# Patient Record
Sex: MALE | Race: WHITE | NOT HISPANIC OR LATINO | Employment: FULL TIME | URBAN - METROPOLITAN AREA
[De-identification: names, ages, dates, MRNs, and addresses within clinical notes are randomized per-mention and may not be internally consistent; named-entity substitution may affect disease eponyms.]

---

## 2017-01-09 ENCOUNTER — TELEPHONE (OUTPATIENT)
Dept: UROLOGY | Facility: CLINIC | Age: 52
End: 2017-01-09

## 2017-01-09 DIAGNOSIS — E29.1 MALE HYPOGONADISM: Primary | ICD-10-CM

## 2017-01-09 NOTE — TELEPHONE ENCOUNTER
----- Message from Elaine Alexis sent at 1/9/2017  2:37 PM CST -----  Patient would like to speak with nurse concerning ordering lab work/needs to have done before appointment on January 20th/please call patient back at [phone] to advise.

## 2017-01-12 ENCOUNTER — LAB VISIT (OUTPATIENT)
Dept: LAB | Facility: HOSPITAL | Age: 52
End: 2017-01-12
Attending: UROLOGY
Payer: COMMERCIAL

## 2017-01-12 DIAGNOSIS — E29.1 MALE HYPOGONADISM: ICD-10-CM

## 2017-01-12 LAB
ERYTHROCYTE [DISTWIDTH] IN BLOOD BY AUTOMATED COUNT: 13 %
HCT VFR BLD AUTO: 47 %
HGB BLD-MCNC: 15.8 G/DL
MCH RBC QN AUTO: 27.6 PG
MCHC RBC AUTO-ENTMCNC: 33.6 %
MCV RBC AUTO: 82 FL
PLATELET # BLD AUTO: 220 K/UL
PMV BLD AUTO: 10 FL
RBC # BLD AUTO: 5.72 M/UL
TESTOST SERPL-MCNC: 571 NG/DL
WBC # BLD AUTO: 8.11 K/UL

## 2017-01-12 PROCEDURE — 84403 ASSAY OF TOTAL TESTOSTERONE: CPT

## 2017-01-12 PROCEDURE — 36415 COLL VENOUS BLD VENIPUNCTURE: CPT | Mod: PO

## 2017-01-12 PROCEDURE — 85027 COMPLETE CBC AUTOMATED: CPT

## 2017-01-20 ENCOUNTER — OFFICE VISIT (OUTPATIENT)
Dept: UROLOGY | Facility: CLINIC | Age: 52
End: 2017-01-20
Payer: COMMERCIAL

## 2017-01-20 VITALS
SYSTOLIC BLOOD PRESSURE: 161 MMHG | HEIGHT: 70 IN | DIASTOLIC BLOOD PRESSURE: 87 MMHG | BODY MASS INDEX: 32.51 KG/M2 | HEART RATE: 104 BPM | WEIGHT: 227.06 LBS

## 2017-01-20 DIAGNOSIS — R79.89 LOW TESTOSTERONE: Primary | ICD-10-CM

## 2017-01-20 LAB
BILIRUB SERPL-MCNC: NORMAL MG/DL
BLOOD URINE, POC: NORMAL
COLOR, POC UA: YELLOW
GLUCOSE UR QL STRIP: NORMAL
KETONES UR QL STRIP: NORMAL
LEUKOCYTE ESTERASE URINE, POC: NORMAL
NITRITE, POC UA: NORMAL
PH, POC UA: 5
PROTEIN, POC: NORMAL
SPECIFIC GRAVITY, POC UA: 1.01
UROBILINOGEN, POC UA: NORMAL

## 2017-01-20 PROCEDURE — 1159F MED LIST DOCD IN RCRD: CPT | Mod: S$GLB,,, | Performed by: UROLOGY

## 2017-01-20 PROCEDURE — 3079F DIAST BP 80-89 MM HG: CPT | Mod: S$GLB,,, | Performed by: UROLOGY

## 2017-01-20 PROCEDURE — 3077F SYST BP >= 140 MM HG: CPT | Mod: S$GLB,,, | Performed by: UROLOGY

## 2017-01-20 PROCEDURE — 99999 PR PBB SHADOW E&M-EST. PATIENT-LVL III: CPT | Mod: PBBFAC,,, | Performed by: UROLOGY

## 2017-01-20 PROCEDURE — 99213 OFFICE O/P EST LOW 20 MIN: CPT | Mod: 25,S$GLB,, | Performed by: UROLOGY

## 2017-01-20 PROCEDURE — 81002 URINALYSIS NONAUTO W/O SCOPE: CPT | Mod: S$GLB,,, | Performed by: UROLOGY

## 2017-01-20 NOTE — MR AVS SNAPSHOT
[facility]  [address] [facility] [address]  [facility] [address]  Phone: [phone]                  Ron Weiss   2017 8:00 AM   Office Visit    Description:  Male : 1965   Provider:  ARMAAN Cunha MD   Department:  [facility]           Reason for Visit     Follow-up     Low Testosterone                To Do List           Goals (5 Years of Data)     None      [facility]     [facility] On Call Nurse Care Line -  Assistance  Registered nurses in the [facility] provide clinical advisement, health education, appointment booking, and other advisory services.  Call for this free service at [phone].             Medications           Message regarding Medications     Verify the changes and/or additions to your medication regime listed below are the same as discussed with your clinician today.  If any of these changes or additions are incorrect, please notify your healthcare provider.             Verify that the below list of medications is an accurate representation of the medications you are currently taking.  If none reported, the list may be blank. If incorrect, please contact your healthcare provider. Carry this list with you in case of emergency.           Current Medications     amlodipine (NORVASC) 10 MG tablet Take 10 mg by mouth once daily.    aspirin 81 MG Chew Take 81 mg by mouth once daily.    fish oil-omega-3 fatty acids 300-1,000 mg capsule Take 2 g by mouth once daily.    multivitamin (THERAGRAN) tablet Take 1 tablet by mouth once daily.    pantoprazole (PROTONIX) 40 MG tablet Take 1 tablet by mouth once daily.    ramipril (ALTACE) 10 MG capsule Take 10 mg by mouth once daily.    testosterone cypionate (DEPOTESTOTERONE CYPIONATE) 200 mg/mL injection Inject 0.5 mLs (100 mg total) into the muscle every 14 (fourteen) days.           Clinical Reference Information           Vital Signs - Last Recorded  Most recent update: 2017  8:08 AM by Iva  "Vj, LPN    BP Pulse Ht Wt BMI    (!) 161/87 104 5' 10" (1.778 m) 103 kg (227 lb 1.2 oz) 32.58 kg/m2      Blood Pressure          Most Recent Value    BP  (!)  161/87      Allergies as of 1/20/2017     Contrast Media      Immunizations Administered on Date of Encounter - 1/20/2017     None      "

## 2017-01-20 NOTE — PROGRESS NOTES
Subjective:       Patient ID: Ron Weiss is a 51 y.o. male.    Chief Complaint: Follow-up and Low Testosterone    HPI     51-year-old with ED and low testosterone. He has been on IM testosterone replacement in the past. He recently started a new job in [employer] and has been unable to come in for injections. He complains of decreased energy and decreased libido both of which improve when on testosterone injections. We resumed testosterone replacement 3 months ago.  Dose 100mg every 2 weeks.  He is feeling better.  He has increased energy and improved libido.  He has no voiding complaints.  His last PSA is 0.8.  Peak testosterone is increased to 521  Urine dipstick shows negative for all components.    Review of Systems   Constitutional: Negative for fever.   Genitourinary: Negative for dysuria and hematuria.       Objective:      Physical Exam   Constitutional: He is oriented to person, place, and time. He appears well-developed and well-nourished.   Pulmonary/Chest: Effort normal.   Neurological: He is alert and oriented to person, place, and time.   Skin: No rash noted.   Psychiatric: He has a normal mood and affect.   Vitals reviewed.      Assessment:       1. Low testosterone        Plan:       Low testosterone  -     POCT urine dipstick without microscope  -     Prostate Specific Antigen, Diagnostic; Future; Expected date: 7/23/17  -     Testosterone; Future; Expected date: 7/23/17  -     Hematocrit; Future; Expected date: 7/23/17  -     Hemoglobin; Future; Expected date: 7/23/17      Increase dose to 150 mg every 2 weeks.  RTC 6 months

## 2017-07-07 ENCOUNTER — LAB VISIT (OUTPATIENT)
Dept: LAB | Facility: HOSPITAL | Age: 52
End: 2017-07-07
Attending: UROLOGY
Payer: COMMERCIAL

## 2017-07-07 DIAGNOSIS — R79.89 LOW TESTOSTERONE: ICD-10-CM

## 2017-07-07 LAB
COMPLEXED PSA SERPL-MCNC: 0.95 NG/ML
HCT VFR BLD AUTO: 50.7 %
HGB BLD-MCNC: 16.8 G/DL
TESTOST SERPL-MCNC: 1238 NG/DL

## 2017-07-07 PROCEDURE — 84153 ASSAY OF PSA TOTAL: CPT

## 2017-07-07 PROCEDURE — 84403 ASSAY OF TOTAL TESTOSTERONE: CPT

## 2017-07-07 PROCEDURE — 36415 COLL VENOUS BLD VENIPUNCTURE: CPT | Mod: PO

## 2017-07-07 PROCEDURE — 85018 HEMOGLOBIN: CPT

## 2017-07-07 PROCEDURE — 85014 HEMATOCRIT: CPT

## 2017-07-21 ENCOUNTER — OFFICE VISIT (OUTPATIENT)
Dept: UROLOGY | Facility: CLINIC | Age: 52
End: 2017-07-21
Payer: COMMERCIAL

## 2017-07-21 VITALS
WEIGHT: 222.69 LBS | SYSTOLIC BLOOD PRESSURE: 140 MMHG | HEIGHT: 70 IN | BODY MASS INDEX: 31.88 KG/M2 | HEART RATE: 78 BPM | DIASTOLIC BLOOD PRESSURE: 84 MMHG

## 2017-07-21 DIAGNOSIS — N52.9 IMPOTENCE: ICD-10-CM

## 2017-07-21 DIAGNOSIS — R79.89 LOW TESTOSTERONE: Primary | ICD-10-CM

## 2017-07-21 DIAGNOSIS — N40.0 BPH WITHOUT URINARY OBSTRUCTION: ICD-10-CM

## 2017-07-21 LAB
BILIRUB SERPL-MCNC: NORMAL MG/DL
BLOOD URINE, POC: NORMAL
COLOR, POC UA: YELLOW
GLUCOSE UR QL STRIP: NORMAL
KETONES UR QL STRIP: NORMAL
LEUKOCYTE ESTERASE URINE, POC: NORMAL
NITRITE, POC UA: NORMAL
PH, POC UA: 5
PROTEIN, POC: NORMAL
SPECIFIC GRAVITY, POC UA: 1.02
UROBILINOGEN, POC UA: NORMAL

## 2017-07-21 PROCEDURE — 99214 OFFICE O/P EST MOD 30 MIN: CPT | Mod: 25,S$GLB,, | Performed by: UROLOGY

## 2017-07-21 PROCEDURE — 81002 URINALYSIS NONAUTO W/O SCOPE: CPT | Mod: S$GLB,,, | Performed by: UROLOGY

## 2017-07-21 PROCEDURE — 99999 PR PBB SHADOW E&M-EST. PATIENT-LVL III: CPT | Mod: PBBFAC,,, | Performed by: UROLOGY

## 2017-07-21 NOTE — PROGRESS NOTES
Subjective:       Patient ID: Ron Weiss is a 52 y.o. male.    Chief Complaint: Follow-up    HPI     52-year-old with ED and low testosterone.  He is on IM testosterone replacement.  Current dose is 150 mg every 2 week.  He is doing well.  He has increased energy and improved libido.  He has no voiding complaints.  He denies hematuria and dysuria.  Labs are reviewed.  Peak testosterone is increased to 1238.  His last PSA is 0.95.  H&H are normal.    Urine dipstick shows negative for all components.    Review of Systems   Constitutional: Negative for fever.   Genitourinary: Negative for dysuria and hematuria.       Objective:      Physical Exam   Constitutional: He is oriented to person, place, and time. He appears well-developed and well-nourished.   HENT:   Head: Normocephalic and atraumatic.   Eyes: Conjunctivae are normal.   Cardiovascular: Normal rate.    Pulmonary/Chest: Effort normal.   Genitourinary: Rectal exam shows no mass and anal tone normal. Prostate is enlarged (40g s/s/a). Prostate is not tender.   Musculoskeletal: Normal range of motion.   Neurological: He is alert and oriented to person, place, and time.   Skin: Skin is warm and dry. No rash noted.   Psychiatric: He has a normal mood and affect.   Vitals reviewed.      Assessment:       1. Low testosterone    2. BPH without urinary obstruction    3. Impotence        Plan:       Low testosterone  -     POCT URINE DIPSTICK WITHOUT MICROSCOPE  -     Testosterone; Future; Expected date: 01/21/2018  -     Hemoglobin; Future; Expected date: 01/21/2018  -     Hematocrit; Future; Expected date: 01/21/2018    BPH without urinary obstruction    Impotence      Continue testosterone at current dose.  RTC 6 months with repeat labs

## 2017-08-21 DIAGNOSIS — Z00.00 ROUTINE GENERAL MEDICAL EXAMINATION AT A HEALTH CARE FACILITY: Primary | ICD-10-CM

## 2017-09-08 ENCOUNTER — HOSPITAL ENCOUNTER (OUTPATIENT)
Dept: RADIOLOGY | Facility: HOSPITAL | Age: 52
Discharge: HOME | End: 2017-09-08
Attending: INTERNAL MEDICINE

## 2017-09-08 ENCOUNTER — HOSPITAL ENCOUNTER (OUTPATIENT)
Dept: PULMONOLOGY | Facility: CLINIC | Age: 52
Discharge: HOME | End: 2017-09-08

## 2017-09-08 ENCOUNTER — HOSPITAL ENCOUNTER (OUTPATIENT)
Dept: CARDIOLOGY | Facility: CLINIC | Age: 52
Discharge: HOME | End: 2017-09-08

## 2017-09-08 ENCOUNTER — CLINICAL SUPPORT (OUTPATIENT)
Dept: INTERNAL MEDICINE | Facility: CLINIC | Age: 52
End: 2017-09-08

## 2017-09-08 ENCOUNTER — CLINICAL SUPPORT (OUTPATIENT)
Dept: AUDIOLOGY | Facility: CLINIC | Age: 52
End: 2017-09-08

## 2017-09-08 ENCOUNTER — OFFICE VISIT (OUTPATIENT)
Dept: INFECTIOUS DISEASES | Facility: CLINIC | Age: 52
End: 2017-09-08

## 2017-09-08 VITALS
DIASTOLIC BLOOD PRESSURE: 87 MMHG | TEMPERATURE: 99 F | BODY MASS INDEX: 31.85 KG/M2 | HEIGHT: 70 IN | HEART RATE: 105 BPM | SYSTOLIC BLOOD PRESSURE: 159 MMHG | WEIGHT: 222.44 LBS

## 2017-09-08 DIAGNOSIS — Z00.00 ROUTINE GENERAL MEDICAL EXAMINATION AT A HEALTH CARE FACILITY: ICD-10-CM

## 2017-09-08 DIAGNOSIS — Z80.42 FAMILY HISTORY OF PROSTATE CANCER IN FATHER: ICD-10-CM

## 2017-09-08 DIAGNOSIS — K21.00 GASTROESOPHAGEAL REFLUX DISEASE WITH ESOPHAGITIS: ICD-10-CM

## 2017-09-08 DIAGNOSIS — Z00.00 PREVENTATIVE HEALTH CARE: Primary | ICD-10-CM

## 2017-09-08 DIAGNOSIS — Z00.00 ROUTINE GENERAL MEDICAL EXAMINATION AT A HEALTH CARE FACILITY: Primary | ICD-10-CM

## 2017-09-08 DIAGNOSIS — E29.1 MALE HYPOGONADISM: ICD-10-CM

## 2017-09-08 DIAGNOSIS — G47.33 OBSTRUCTIVE SLEEP APNEA: Chronic | ICD-10-CM

## 2017-09-08 DIAGNOSIS — I10 ESSENTIAL HYPERTENSION: ICD-10-CM

## 2017-09-08 DIAGNOSIS — J30.0 CHRONIC VASOMOTOR RHINITIS: ICD-10-CM

## 2017-09-08 DIAGNOSIS — Z01.10 EXAMINATION OF EARS AND HEARING: Primary | ICD-10-CM

## 2017-09-08 LAB
ALBUMIN SERPL BCP-MCNC: 4 G/DL
ALP SERPL-CCNC: 67 U/L
ALT SERPL W/O P-5'-P-CCNC: 38 U/L
ANION GAP SERPL CALC-SCNC: 13 MMOL/L
AST SERPL-CCNC: 27 U/L
BACTERIA #/AREA URNS AUTO: NORMAL /HPF
BILIRUB SERPL-MCNC: 0.9 MG/DL
BILIRUB UR QL STRIP: NEGATIVE
BUN SERPL-MCNC: 15 MG/DL
CALCIUM SERPL-MCNC: 9.3 MG/DL
CHLORIDE SERPL-SCNC: 103 MMOL/L
CHOLEST SERPL-MCNC: 181 MG/DL
CHOLEST/HDLC SERPL: 4.1 {RATIO}
CLARITY UR REFRACT.AUTO: CLEAR
CO2 SERPL-SCNC: 22 MMOL/L
COLOR UR AUTO: ABNORMAL
CREAT SERPL-MCNC: 1.2 MG/DL
ERYTHROCYTE [DISTWIDTH] IN BLOOD BY AUTOMATED COUNT: 13.5 %
EST. GFR  (AFRICAN AMERICAN): >60 ML/MIN/1.73 M^2
EST. GFR  (NON AFRICAN AMERICAN): >60 ML/MIN/1.73 M^2
GGT SERPL-CCNC: 42 U/L
GLUCOSE SERPL-MCNC: 101 MG/DL
GLUCOSE UR QL STRIP: NEGATIVE
HCT VFR BLD AUTO: 49.4 %
HDLC SERPL-MCNC: 44 MG/DL
HDLC SERPL: 24.3 %
HGB BLD-MCNC: 16.9 G/DL
HGB UR QL STRIP: ABNORMAL
IGE SERPL-ACNC: 91 IU/ML
IRON SERPL-MCNC: 107 UG/DL
KETONES UR QL STRIP: ABNORMAL
LDH SERPL L TO P-CCNC: 200 U/L
LDLC SERPL CALC-MCNC: 120.6 MG/DL
LEUKOCYTE ESTERASE UR QL STRIP: NEGATIVE
MCH RBC QN AUTO: 27.8 PG
MCHC RBC AUTO-ENTMCNC: 34.2 G/DL
MCV RBC AUTO: 81 FL
MICROSCOPIC COMMENT: NORMAL
NITRITE UR QL STRIP: NEGATIVE
NONHDLC SERPL-MCNC: 137 MG/DL
PH UR STRIP: 7 [PH] (ref 5–8)
PHOSPHATE SERPL-MCNC: 2 MG/DL
PLATELET # BLD AUTO: 164 K/UL
PMV BLD AUTO: 9.6 FL
POTASSIUM SERPL-SCNC: 3.8 MMOL/L
PRE FEV1 FVC: 79
PRE FEV1: 3.18
PRE FVC: 4.04
PREDICTED FEV1 FVC: 81
PREDICTED FEV1: 3.77
PREDICTED FVC: 4.63
PROT SERPL-MCNC: 8.1 G/DL
PROT SERPL-MCNC: 8.1 G/DL
PROT UR QL STRIP: NEGATIVE
RBC # BLD AUTO: 6.08 M/UL
RBC #/AREA URNS AUTO: 0 /HPF (ref 0–4)
SODIUM SERPL-SCNC: 138 MMOL/L
SP GR UR STRIP: 1.01 (ref 1–1.03)
TRIGL SERPL-MCNC: 82 MG/DL
URATE SERPL-MCNC: 6.7 MG/DL
URN SPEC COLLECT METH UR: ABNORMAL
UROBILINOGEN UR STRIP-ACNC: NEGATIVE EU/DL
WBC # BLD AUTO: 5.55 K/UL
WBC #/AREA URNS AUTO: 0 /HPF (ref 0–5)

## 2017-09-08 PROCEDURE — 71020 XR CHEST PA AND LATERAL: CPT | Mod: 26,,, | Performed by: RADIOLOGY

## 2017-09-08 PROCEDURE — 99396 PREV VISIT EST AGE 40-64: CPT | Mod: ,,, | Performed by: INTERNAL MEDICINE

## 2017-09-08 PROCEDURE — 99999 PR PBB SHADOW E&M-EST. PATIENT-LVL I: CPT | Mod: PBBFAC,,,

## 2017-09-08 PROCEDURE — 80061 LIPID PANEL: CPT

## 2017-09-08 PROCEDURE — 84100 ASSAY OF PHOSPHORUS: CPT

## 2017-09-08 PROCEDURE — 82785 ASSAY OF IGE: CPT

## 2017-09-08 PROCEDURE — 71020 XR CHEST PA AND LATERAL: CPT | Mod: TC

## 2017-09-08 PROCEDURE — 80053 COMPREHEN METABOLIC PANEL: CPT

## 2017-09-08 PROCEDURE — 92552 PURE TONE AUDIOMETRY AIR: CPT | Mod: ,,, | Performed by: AUDIOLOGIST

## 2017-09-08 PROCEDURE — 83615 LACTATE (LD) (LDH) ENZYME: CPT

## 2017-09-08 PROCEDURE — 94010 BREATHING CAPACITY TEST: CPT | Mod: ,,, | Performed by: INTERNAL MEDICINE

## 2017-09-08 PROCEDURE — 82977 ASSAY OF GGT: CPT

## 2017-09-08 PROCEDURE — 85027 COMPLETE CBC AUTOMATED: CPT

## 2017-09-08 PROCEDURE — 99999 PR PBB SHADOW E&M-EST. PATIENT-LVL III: CPT | Mod: PBBFAC,,, | Performed by: INTERNAL MEDICINE

## 2017-09-08 PROCEDURE — 93000 ELECTROCARDIOGRAM COMPLETE: CPT | Mod: ,,, | Performed by: INTERNAL MEDICINE

## 2017-09-08 PROCEDURE — 81001 URINALYSIS AUTO W/SCOPE: CPT

## 2017-09-08 PROCEDURE — 36415 COLL VENOUS BLD VENIPUNCTURE: CPT

## 2017-09-08 PROCEDURE — 84550 ASSAY OF BLOOD/URIC ACID: CPT

## 2017-09-08 PROCEDURE — 83540 ASSAY OF IRON: CPT

## 2017-09-08 RX ORDER — FLUTICASONE PROPIONATE 50 MCG
1 SPRAY, SUSPENSION (ML) NASAL DAILY
COMMUNITY

## 2017-09-08 NOTE — PROGRESS NOTES
Subjective:      Patient ID: Ron Weiss is a 52 y.o. male.    Chief Complaint:Executive Health      History of Present Illness    HTN (hypertension)  Patient has been on ramipril 10 mg and amlodipine 10 mg daily with good control of BP as measured by his home readings which usually run ~ 130/70. His weight is excessive but stable. We discussed decreasing caloric intake and increasing exercise.     Obstructive sleep apnea  Pt uses CPAP with good results.     Gastroesophageal reflux disease with esophagitis  He has had symptomatic GERD in past and underwent EGD in past at [facility]. Advised then to start protonix which he takes daily with good control of his symptoms.     Male hypogonadism  Was found by urology to have low testosterone level and is on q 2 weekly injections of testosterone.    Family history of prostate cancer in father  Pt is followed by Dr. Cunha and had PSA 0.95 in [facility].       The laboratory studies were reviewed and discussed with the pt.  He has an excellent lipid profile:  Lab Results   Component Value Date    CHOL 181 09/08/2017    CHOL 192 09/07/2016    CHOL 160 08/25/2015     Lab Results   Component Value Date    HDL 44 09/08/2017    HDL 45 09/07/2016    HDL 39 (L) 08/25/2015     Lab Results   Component Value Date    LDLCALC 120.6 09/08/2017    LDLCALC 112.4 09/07/2016    LDLCALC 97.2 08/25/2015     Lab Results   Component Value Date    TRIG 82 09/08/2017    TRIG 173 (H) 09/07/2016    TRIG 119 08/25/2015     Lab Results   Component Value Date    CHOLHDL 24.3 09/08/2017    CHOLHDL 23.4 09/07/2016    CHOLHDL 24.4 08/25/2015   He has had an extensive cardiac workup elsewhere with cardiolyte stress test, carotid US and coronary calcium scores, all of which are reportedly per pt normal.    Review of Systems   Constitution: Negative for chills, decreased appetite, fever, weakness, malaise/fatigue, night sweats, weight gain and weight loss.   HENT: Negative for ear pain, hearing loss, hoarse  voice, sore throat and tinnitus.    Eyes: Negative for blurred vision, redness and visual disturbance.   Cardiovascular: Negative for chest pain, leg swelling and palpitations.   Respiratory: Positive for cough. Negative for hemoptysis, shortness of breath and sputum production.    Hematologic/Lymphatic: Negative for adenopathy. Does not bruise/bleed easily.   Skin: Negative for dry skin, itching, rash and suspicious lesions.   Musculoskeletal: Negative for back pain, joint pain, myalgias and neck pain.   Gastrointestinal: Negative for abdominal pain, constipation, diarrhea, heartburn, nausea and vomiting.   Genitourinary: Negative for dysuria, flank pain, frequency, hematuria, hesitancy and urgency.   Neurological: Negative for dizziness, headaches, numbness and paresthesias.   Psychiatric/Behavioral: Negative for depression and memory loss. The patient does not have insomnia and is not nervous/anxious.      Objective:   Physical Exam   Constitutional: He is oriented to person, place, and time. He appears well-developed and well-nourished.   Overweight     HENT:   Head: Normocephalic and atraumatic.   Right Ear: External ear normal.   Left Ear: External ear normal.   Mouth/Throat: Oropharynx is clear and moist.   Eyes: Conjunctivae and EOM are normal. Pupils are equal, round, and reactive to light.   Neck: Normal range of motion. Neck supple. No thyromegaly present.   Cardiovascular: Normal rate, regular rhythm and normal heart sounds.    No murmur heard.  Pulmonary/Chest: Effort normal and breath sounds normal. He has no wheezes. He has no rales.   Abdominal: Soft. Bowel sounds are normal. He exhibits no mass. There is no tenderness. There is no rebound.   Musculoskeletal: Normal range of motion.   Lymphadenopathy:     He has no cervical adenopathy.   Neurological: He is alert and oriented to person, place, and time.   Skin: Skin is warm and dry.   Psychiatric: He has a normal mood and affect. His behavior is  normal.   Vitals reviewed.    Assessment:       1. Preventative health care    2. Chronic vasomotor rhinitis    3. Family history of prostate cancer in father    4. Obstructive sleep apnea    5. Essential hypertension    6. Male hypogonadism    7. Gastroesophageal reflux disease with esophagitis          Plan:        1. Continue present meds   2. Flu shot today   3. Weight reduction via caloric intake reduction   4. Check on date of last colonoscopy and tetanus vaccination

## 2017-09-08 NOTE — PROGRESS NOTES
Mr. Weiss was seen in the clinic today for an [facility] hearing screening.  He denied a subjective change in his he aring, tinnitus, and dizziness.     Otoscopy revealed significant non-occluding cerumen, AS. Pure tone air conduction screening revealed normal hearing, bilaterally.    The results and recommendations from today's evaluation were discussed with Mr. Weiss.    Recommendations:  1. Annual hearing evaluation  2. Use of hearing protection in noise  3. Make appointment with physician for cerumen removal AS

## 2018-01-03 DIAGNOSIS — R79.89 LOW TESTOSTERONE: ICD-10-CM

## 2018-01-03 DIAGNOSIS — Z12.5 SCREENING FOR PROSTATE CANCER: Primary | ICD-10-CM

## 2018-01-03 RX ORDER — TESTOSTERONE CYPIONATE 200 MG/ML
100 INJECTION, SOLUTION INTRAMUSCULAR
Qty: 10 ML | Refills: 2 | Status: SHIPPED | OUTPATIENT
Start: 2018-01-03 | End: 2018-08-03

## 2018-01-03 NOTE — TELEPHONE ENCOUNTER
----- Message from Jeana Dye sent at 1/3/2018 10:33 AM CST -----  Contact: Jerome  Patient is asking for lab test for PSA to be added to lab appointment for 1/8/18 as has appointment scheduled for 1/26/18. Also requesting refill Rx testosterone cypionate (DEPOTESTOTERONE CYPIONATE) 200 mg/mL injection to be sent today to [facility] at [phone]. Please call to confirm [phone]. Thanks!

## 2018-01-03 NOTE — TELEPHONE ENCOUNTER
----- Message from Destini Christensen sent at 1/3/2018  2:54 PM CST -----  Refill on Rx Testosterone today.   [facility] [place] pharmacy/[phone].  Please call patient at [phone] when called in.

## 2018-01-04 NOTE — TELEPHONE ENCOUNTER
----- Message from Zulema Sanabria sent at 1/4/2018 11:13 AM CST -----  Contact: Patient  Ron, patient [phone] calling for a refill on testosterone cypionate (DEPOTESTOTERONE CYPIONATE) 200 mg/mL injection. Please advise when complete. Thanks.

## 2018-01-08 ENCOUNTER — LAB VISIT (OUTPATIENT)
Dept: LAB | Facility: HOSPITAL | Age: 53
End: 2018-01-08
Attending: UROLOGY
Payer: COMMERCIAL

## 2018-01-08 DIAGNOSIS — R79.89 LOW TESTOSTERONE: ICD-10-CM

## 2018-01-08 DIAGNOSIS — Z12.5 SCREENING FOR PROSTATE CANCER: ICD-10-CM

## 2018-01-08 LAB
COMPLEXED PSA SERPL-MCNC: 0.87 NG/ML
HCT VFR BLD AUTO: 51.6 %
HGB BLD-MCNC: 16.8 G/DL
TESTOST SERPL-MCNC: 1062 NG/DL

## 2018-01-08 PROCEDURE — 84153 ASSAY OF PSA TOTAL: CPT

## 2018-01-08 PROCEDURE — 85018 HEMOGLOBIN: CPT

## 2018-01-08 PROCEDURE — 84403 ASSAY OF TOTAL TESTOSTERONE: CPT

## 2018-01-08 PROCEDURE — 85014 HEMATOCRIT: CPT

## 2018-01-08 PROCEDURE — 36415 COLL VENOUS BLD VENIPUNCTURE: CPT | Mod: PO

## 2018-01-26 ENCOUNTER — OFFICE VISIT (OUTPATIENT)
Dept: UROLOGY | Facility: CLINIC | Age: 53
End: 2018-01-26
Payer: COMMERCIAL

## 2018-01-26 VITALS
DIASTOLIC BLOOD PRESSURE: 93 MMHG | WEIGHT: 229.25 LBS | HEIGHT: 70 IN | SYSTOLIC BLOOD PRESSURE: 143 MMHG | BODY MASS INDEX: 32.82 KG/M2 | HEART RATE: 95 BPM

## 2018-01-26 DIAGNOSIS — R79.89 LOW TESTOSTERONE IN MALE: Primary | ICD-10-CM

## 2018-01-26 PROCEDURE — 99999 PR PBB SHADOW E&M-EST. PATIENT-LVL III: CPT | Mod: PBBFAC,,, | Performed by: UROLOGY

## 2018-01-26 PROCEDURE — 99213 OFFICE O/P EST LOW 20 MIN: CPT | Mod: S$GLB,,, | Performed by: UROLOGY

## 2018-01-26 PROCEDURE — 81002 URINALYSIS NONAUTO W/O SCOPE: CPT | Mod: S$GLB,,, | Performed by: UROLOGY

## 2018-01-26 NOTE — PROGRESS NOTES
Subjective:       Patient ID: Ron Weiss is a 52 y.o. male.    Chief Complaint: Follow-up    HPI     52-year-old with ED and low testosterone.  He is on IM testosterone replacement.  Current dose is 150 mg every 2 week.  He is doing well.  He has increased energy and improved libido.  He has no voiding complaints.  He denies hematuria and dysuria.  Labs are reviewed.  Peak testosterone is increased to 1062.  His last PSA is 0.87.  H&H are normal.    Urine dipstick shows negative for all components.    Review of Systems   Constitutional: Negative for fever.   Genitourinary: Negative for dysuria and hematuria.       Objective:      Physical Exam   Constitutional: He is oriented to person, place, and time. He appears well-developed and well-nourished.   Pulmonary/Chest: Effort normal.   Abdominal: Soft.   Neurological: He is alert and oriented to person, place, and time.   Skin: No rash noted.   Psychiatric: He has a normal mood and affect.   Vitals reviewed.      Assessment:       1. Low testosterone in male        Plan:       Low testosterone in male  -     POCT URINE DIPSTICK WITHOUT MICROSCOPE  -     Testosterone; Future; Expected date: 07/29/2018  -     Hematocrit; Future; Expected date: 07/29/2018  -     Hemoglobin; Future; Expected date: 07/29/2018      Continue testosterone at current dose.  RTC 6 months with repeat labs

## 2018-07-24 ENCOUNTER — TELEPHONE (OUTPATIENT)
Dept: UROLOGY | Facility: CLINIC | Age: 53
End: 2018-07-24

## 2018-07-24 DIAGNOSIS — Z80.42 FAMILY HISTORY OF PROSTATE CANCER: Primary | ICD-10-CM

## 2018-07-24 NOTE — TELEPHONE ENCOUNTER
Pt would like to get a psa total ordered for Thursday's lab work. He has had a psa within the last 6 months but would like to have this ordered and pay for it himself.

## 2018-07-24 NOTE — TELEPHONE ENCOUNTER
Spoke to pt and he is going to call me back if he wants a psa ordered. Pt had one done 6 months ago. Pt is going to call our estimates services to find out the cost. He will let me know

## 2018-07-24 NOTE — TELEPHONE ENCOUNTER
----- Message from Natalya Augustin sent at 7/24/2018  2:57 PM CDT -----  Type: Needs Medical Advice    Who Called:  Patient  Best Call Back Number: [phone]  Additional Information: Patient is hoping to come in on 7/26/18 to get his labs done but he needs office to add a PSA to the orders. Please place orders and call to schedule.

## 2018-07-24 NOTE — TELEPHONE ENCOUNTER
----- Message from Luzma Lagunas sent at 7/24/2018  3:23 PM CDT -----  Contact: Patient  Type: Needs Medical Advice    Who Called:  Patient  Symptoms (please be specific):  na  How long has patient had these symptoms:  ángel  Pharmacy name and phone #:  [facility]  Best Call Back Number:    Additional Information: Calling to speak back with Iva. Please advise. Call to pod. No answer.

## 2018-07-26 ENCOUNTER — LAB VISIT (OUTPATIENT)
Dept: LAB | Facility: HOSPITAL | Age: 53
End: 2018-07-26
Attending: UROLOGY
Payer: COMMERCIAL

## 2018-07-26 DIAGNOSIS — R79.89 LOW TESTOSTERONE IN MALE: ICD-10-CM

## 2018-07-26 DIAGNOSIS — Z80.42 FAMILY HISTORY OF PROSTATE CANCER: ICD-10-CM

## 2018-07-26 LAB
COMPLEXED PSA SERPL-MCNC: 0.79 NG/ML
HCT VFR BLD AUTO: 51.9 %
HGB BLD-MCNC: 17.2 G/DL

## 2018-07-26 PROCEDURE — 36415 COLL VENOUS BLD VENIPUNCTURE: CPT | Mod: PO

## 2018-07-26 PROCEDURE — 84153 ASSAY OF PSA TOTAL: CPT

## 2018-07-26 PROCEDURE — 85018 HEMOGLOBIN: CPT

## 2018-07-26 PROCEDURE — 84403 ASSAY OF TOTAL TESTOSTERONE: CPT

## 2018-07-26 PROCEDURE — 85014 HEMATOCRIT: CPT

## 2018-07-27 LAB — TESTOST SERPL-MCNC: NORMAL NG/DL

## 2018-08-01 LAB — MAYO MISCELLANEOUS RESULT (REF): NORMAL

## 2018-08-03 ENCOUNTER — OFFICE VISIT (OUTPATIENT)
Dept: UROLOGY | Facility: CLINIC | Age: 53
End: 2018-08-03
Payer: COMMERCIAL

## 2018-08-03 VITALS
SYSTOLIC BLOOD PRESSURE: 144 MMHG | BODY MASS INDEX: 32.82 KG/M2 | HEART RATE: 89 BPM | DIASTOLIC BLOOD PRESSURE: 81 MMHG | WEIGHT: 229.25 LBS | HEIGHT: 70 IN

## 2018-08-03 DIAGNOSIS — E29.1 MALE HYPOGONADISM: Primary | ICD-10-CM

## 2018-08-03 DIAGNOSIS — Z12.5 SCREENING FOR PROSTATE CANCER: ICD-10-CM

## 2018-08-03 DIAGNOSIS — Z80.42 FAMILY HISTORY OF PROSTATE CANCER: ICD-10-CM

## 2018-08-03 LAB
BILIRUB SERPL-MCNC: NORMAL MG/DL
BLOOD URINE, POC: NORMAL
COLOR, POC UA: YELLOW
GLUCOSE UR QL STRIP: NORMAL
KETONES UR QL STRIP: NORMAL
LEUKOCYTE ESTERASE URINE, POC: NORMAL
NITRITE, POC UA: NORMAL
PH, POC UA: 7
PROTEIN, POC: NORMAL
SPECIFIC GRAVITY, POC UA: 1.02
UROBILINOGEN, POC UA: NORMAL

## 2018-08-03 PROCEDURE — 3079F DIAST BP 80-89 MM HG: CPT | Mod: CPTII,S$GLB,, | Performed by: UROLOGY

## 2018-08-03 PROCEDURE — 3008F BODY MASS INDEX DOCD: CPT | Mod: CPTII,S$GLB,, | Performed by: UROLOGY

## 2018-08-03 PROCEDURE — 99999 PR PBB SHADOW E&M-EST. PATIENT-LVL III: CPT | Mod: PBBFAC,,, | Performed by: UROLOGY

## 2018-08-03 PROCEDURE — 3077F SYST BP >= 140 MM HG: CPT | Mod: CPTII,S$GLB,, | Performed by: UROLOGY

## 2018-08-03 PROCEDURE — 99214 OFFICE O/P EST MOD 30 MIN: CPT | Mod: 25,S$GLB,, | Performed by: UROLOGY

## 2018-08-03 PROCEDURE — 81002 URINALYSIS NONAUTO W/O SCOPE: CPT | Mod: S$GLB,,, | Performed by: UROLOGY

## 2018-08-03 NOTE — PROGRESS NOTES
Subjective:       Patient ID: Ron Weiss is a 53 y.o. male.    Chief Complaint: Hypogonadism    HPI     53-year-old with ED and low testosterone.  He is on IM testosterone replacement.  Current dose is 150 mg every 2 week.  He is doing well.  He has increased energy and improved libido.  He has no voiding complaints.  He denies hematuria and dysuria.  Labs are reviewed.  Peak testosterone is increased to 904.  His last PSA is 0.79.  H&H are normal.  He has a family history of prostate cancer.  Urine dipstick shows negative for all components.    Review of Systems   Constitutional: Negative for fever.   Genitourinary: Negative for dysuria and hematuria.       Objective:      Physical Exam   Constitutional: He is oriented to person, place, and time. He appears well-developed and well-nourished.   HENT:   Head: Normocephalic and atraumatic.   Eyes: Conjunctivae are normal.   Cardiovascular: Normal rate.    Pulmonary/Chest: Effort normal.   Genitourinary: Rectal exam shows no mass and anal tone normal. Prostate is enlarged (30g, s/s/a). Prostate is not tender.   Musculoskeletal: Normal range of motion.   Neurological: He is alert and oriented to person, place, and time.   Skin: Skin is warm and dry. No rash noted.   Psychiatric: He has a normal mood and affect.   Vitals reviewed.      Assessment:       1. Male hypogonadism    2. Family history of prostate cancer    3. Screening for prostate cancer        Plan:       Male hypogonadism  -     POCT URINE DIPSTICK WITHOUT MICROSCOPE  -     Hemoglobin; Future; Expected date: 02/03/2019  -     Hematocrit; Future; Expected date: 02/03/2019  -     Testosterone; Future; Expected date: 02/03/2019    Family history of prostate cancer    Screening for prostate cancer  -     PSA, Screening; Future; Expected date: 02/03/2019      Continue at current dose.  RTC 6 months

## 2018-09-14 DIAGNOSIS — Z00.00 ROUTINE GENERAL MEDICAL EXAMINATION AT A HEALTH CARE FACILITY: Primary | ICD-10-CM

## 2018-10-19 ENCOUNTER — HOSPITAL ENCOUNTER (OUTPATIENT)
Dept: PULMONOLOGY | Facility: CLINIC | Age: 53
Discharge: HOME | End: 2018-10-19

## 2018-10-19 ENCOUNTER — HOSPITAL ENCOUNTER (OUTPATIENT)
Dept: CARDIOLOGY | Facility: CLINIC | Age: 53
Discharge: HOME | End: 2018-10-19

## 2018-10-19 ENCOUNTER — CLINICAL SUPPORT (OUTPATIENT)
Dept: INTERNAL MEDICINE | Facility: CLINIC | Age: 53
End: 2018-10-19

## 2018-10-19 ENCOUNTER — CLINICAL SUPPORT (OUTPATIENT)
Dept: AUDIOLOGY | Facility: CLINIC | Age: 53
End: 2018-10-19

## 2018-10-19 ENCOUNTER — HOSPITAL ENCOUNTER (OUTPATIENT)
Dept: RADIOLOGY | Facility: HOSPITAL | Age: 53
Discharge: HOME | End: 2018-10-19
Attending: INTERNAL MEDICINE

## 2018-10-19 ENCOUNTER — OFFICE VISIT (OUTPATIENT)
Dept: INTERNAL MEDICINE | Facility: CLINIC | Age: 53
End: 2018-10-19

## 2018-10-19 DIAGNOSIS — Z00.00 ROUTINE GENERAL MEDICAL EXAMINATION AT A HEALTH CARE FACILITY: ICD-10-CM

## 2018-10-19 DIAGNOSIS — Z00.00 ROUTINE GENERAL MEDICAL EXAMINATION AT A HEALTH CARE FACILITY: Primary | ICD-10-CM

## 2018-10-19 LAB
ALBUMIN SERPL BCP-MCNC: 4.2 G/DL
ALP SERPL-CCNC: 64 U/L
ALT SERPL W/O P-5'-P-CCNC: 46 U/L
ANION GAP SERPL CALC-SCNC: 9 MMOL/L
AST SERPL-CCNC: 28 U/L
BACTERIA #/AREA URNS AUTO: NORMAL /HPF
BASOPHILS # BLD AUTO: 0.05 K/UL
BASOPHILS NFR BLD: 0.8 %
BILIRUB SERPL-MCNC: 1 MG/DL
BILIRUB UR QL STRIP: NEGATIVE
BUN SERPL-MCNC: 14 MG/DL
CALCIUM SERPL-MCNC: 9.7 MG/DL
CHLORIDE SERPL-SCNC: 103 MMOL/L
CHOLEST SERPL-MCNC: 175 MG/DL
CHOLEST/HDLC SERPL: 3.9 {RATIO}
CLARITY UR REFRACT.AUTO: CLEAR
CO2 SERPL-SCNC: 27 MMOL/L
COLOR UR AUTO: NORMAL
CREAT SERPL-MCNC: 1.1 MG/DL
DIFFERENTIAL METHOD: ABNORMAL
EOSINOPHIL # BLD AUTO: 0.1 K/UL
EOSINOPHIL NFR BLD: 2 %
ERYTHROCYTE [DISTWIDTH] IN BLOOD BY AUTOMATED COUNT: 13.2 %
EST. GFR  (AFRICAN AMERICAN): >60 ML/MIN/1.73 M^2
EST. GFR  (NON AFRICAN AMERICAN): >60 ML/MIN/1.73 M^2
GGT SERPL-CCNC: 55 U/L
GLUCOSE SERPL-MCNC: 109 MG/DL
GLUCOSE UR QL STRIP: NEGATIVE
HCT VFR BLD AUTO: 54.1 %
HDLC SERPL-MCNC: 45 MG/DL
HDLC SERPL: 25.7 %
HGB BLD-MCNC: 17 G/DL
HGB UR QL STRIP: NEGATIVE
IGE SERPL-ACNC: 169 IU/ML
IMM GRANULOCYTES # BLD AUTO: 0.06 K/UL
IMM GRANULOCYTES NFR BLD AUTO: 0.9 %
IRON SERPL-MCNC: 117 UG/DL
KETONES UR QL STRIP: NEGATIVE
LDH SERPL L TO P-CCNC: 244 U/L
LDLC SERPL CALC-MCNC: 107.2 MG/DL
LEUKOCYTE ESTERASE UR QL STRIP: NEGATIVE
LYMPHOCYTES # BLD AUTO: 1.7 K/UL
LYMPHOCYTES NFR BLD: 26.6 %
MCH RBC QN AUTO: 26.9 PG
MCHC RBC AUTO-ENTMCNC: 31.4 G/DL
MCV RBC AUTO: 86 FL
MICROSCOPIC COMMENT: NORMAL
MONOCYTES # BLD AUTO: 0.5 K/UL
MONOCYTES NFR BLD: 7.8 %
NEUTROPHILS # BLD AUTO: 4.1 K/UL
NEUTROPHILS NFR BLD: 61.9 %
NITRITE UR QL STRIP: NEGATIVE
NONHDLC SERPL-MCNC: 130 MG/DL
NRBC BLD-RTO: 0 /100 WBC
PH UR STRIP: 7 [PH] (ref 5–8)
PHOSPHATE SERPL-MCNC: 1.9 MG/DL
PLATELET # BLD AUTO: 197 K/UL
PMV BLD AUTO: 9.8 FL
POTASSIUM SERPL-SCNC: 4 MMOL/L
PRE FEV1 FVC: 79
PRE FEV1: 2.98
PRE FVC: 3.76
PREDICTED FEV1 FVC: 81
PREDICTED FEV1: 3.74
PREDICTED FVC: 4.6
PROT SERPL-MCNC: 8.1 G/DL
PROT SERPL-MCNC: 8.1 G/DL
PROT UR QL STRIP: NEGATIVE
RBC # BLD AUTO: 6.31 M/UL
RBC #/AREA URNS AUTO: 1 /HPF (ref 0–4)
SODIUM SERPL-SCNC: 139 MMOL/L
SP GR UR STRIP: 1 (ref 1–1.03)
SQUAMOUS #/AREA URNS AUTO: 2 /HPF
TRIGL SERPL-MCNC: 114 MG/DL
URATE SERPL-MCNC: 6.2 MG/DL
URN SPEC COLLECT METH UR: NORMAL
UROBILINOGEN UR STRIP-ACNC: NEGATIVE EU/DL
WBC # BLD AUTO: 6.55 K/UL
WBC #/AREA URNS AUTO: 1 /HPF (ref 0–5)

## 2018-10-19 PROCEDURE — 71046 X-RAY EXAM CHEST 2 VIEWS: CPT | Mod: 26,,, | Performed by: RADIOLOGY

## 2018-10-19 PROCEDURE — 84100 ASSAY OF PHOSPHORUS: CPT

## 2018-10-19 PROCEDURE — 99999 PR PBB SHADOW E&M-EST. PATIENT-LVL III: CPT | Mod: PBBFAC,,, | Performed by: INTERNAL MEDICINE

## 2018-10-19 PROCEDURE — 92552 PURE TONE AUDIOMETRY AIR: CPT | Mod: S$GLB,,, | Performed by: AUDIOLOGIST-HEARING AID FITTER

## 2018-10-19 PROCEDURE — 82785 ASSAY OF IGE: CPT

## 2018-10-19 PROCEDURE — 80061 LIPID PANEL: CPT

## 2018-10-19 PROCEDURE — 82977 ASSAY OF GGT: CPT

## 2018-10-19 PROCEDURE — 36415 COLL VENOUS BLD VENIPUNCTURE: CPT

## 2018-10-19 PROCEDURE — 83540 ASSAY OF IRON: CPT

## 2018-10-19 PROCEDURE — 85025 COMPLETE CBC W/AUTO DIFF WBC: CPT

## 2018-10-19 PROCEDURE — 99386 PREV VISIT NEW AGE 40-64: CPT | Mod: S$GLB,,, | Performed by: INTERNAL MEDICINE

## 2018-10-19 PROCEDURE — 81001 URINALYSIS AUTO W/SCOPE: CPT

## 2018-10-19 PROCEDURE — 94010 BREATHING CAPACITY TEST: CPT | Mod: S$GLB,,, | Performed by: INTERNAL MEDICINE

## 2018-10-19 PROCEDURE — 83615 LACTATE (LD) (LDH) ENZYME: CPT

## 2018-10-19 PROCEDURE — 93000 ELECTROCARDIOGRAM COMPLETE: CPT | Mod: S$GLB,,, | Performed by: INTERNAL MEDICINE

## 2018-10-19 PROCEDURE — 71046 X-RAY EXAM CHEST 2 VIEWS: CPT | Mod: TC,FY

## 2018-10-19 PROCEDURE — 80053 COMPREHEN METABOLIC PANEL: CPT

## 2018-10-19 PROCEDURE — 84550 ASSAY OF BLOOD/URIC ACID: CPT

## 2018-10-19 RX ORDER — MONTELUKAST SODIUM 10 MG/1
10 TABLET ORAL NIGHTLY
COMMUNITY
End: 2020-02-10

## 2018-10-19 RX ORDER — TESTOSTERONE CYPIONATE 1000 MG/10ML
200 INJECTION, SOLUTION INTRAMUSCULAR
COMMUNITY
End: 2019-01-02 | Stop reason: SDUPTHER

## 2018-10-19 NOTE — PROGRESS NOTES
Ron Weiss was seen in the clinic today for an [facility] hearing screening. Mr. Weiss denied hearing loss, tinnitus, and dizziness. He  Reported occasional noise exposure.     Pure tone air conduction screening revealed hearing within normal limits in the right ear and normal to borderline normal hearing in the left ear.    The results and recommendations from today's evaluation were discussed with Mr. Weiss.    Recommendations:  1. Annual hearing evaluation  2. Use of hearing protection in noise

## 2018-10-26 VITALS
DIASTOLIC BLOOD PRESSURE: 86 MMHG | SYSTOLIC BLOOD PRESSURE: 136 MMHG | WEIGHT: 229.38 LBS | BODY MASS INDEX: 32.92 KG/M2 | HEART RATE: 68 BPM

## 2018-10-26 NOTE — PROGRESS NOTES
Subjective:       Patient ID: Ron Weiss is a 53 y.o. male.    Chief Complaint: Executive Health    HPI Mr Weiss is a very pleasant gentleman from [facility] here for his Executive Health exam. Overall doing well and had no specific complaints. WE discussed the importance of proper nutrition, exercise, weight management vis a vis future health issues.  I am sending copies of his studies including blood work that was basically unremarkable with normal fasting glucose levels, normal renal/liver function. There was slight increase in RBC's at 6.31 that may be due to his sleep apnea. All other parameters were negative including chest xray and unchanged EKG.  Review of Systems   Constitutional: Negative for activity change, appetite change, fatigue and unexpected weight change.   HENT: Negative.    Respiratory: Negative for cough, shortness of breath and wheezing.    Cardiovascular: Negative for chest pain and leg swelling.   Gastrointestinal: Negative for abdominal distention, abdominal pain and blood in stool.   Genitourinary: Negative for difficulty urinating.   Musculoskeletal: Negative for arthralgias, back pain and joint swelling.   Skin: Negative.    Neurological: Negative for dizziness, weakness, light-headedness and headaches.       Objective:      Physical Exam   Constitutional: He is oriented to person, place, and time. He appears well-developed and well-nourished.   HENT:   Head: Normocephalic and atraumatic.   Right Ear: External ear normal.   Left Ear: External ear normal.   Mouth/Throat: Oropharynx is clear and moist. No oropharyngeal exudate.   Eyes: Conjunctivae and EOM are normal. Pupils are equal, round, and reactive to light. Right eye exhibits no discharge. Left eye exhibits no discharge.   Neck: Normal range of motion. Neck supple. No JVD present. No thyromegaly present.   Cardiovascular: Normal rate, regular rhythm, normal heart sounds and intact distal pulses.   No murmur  heard.  Pulmonary/Chest: Effort normal and breath sounds normal. No respiratory distress. He has no wheezes. He exhibits no tenderness.   Abdominal: Soft. Bowel sounds are normal. He exhibits no distension and no mass. There is no tenderness.   Musculoskeletal: Normal range of motion. He exhibits no edema or tenderness.   Lymphadenopathy:     He has no cervical adenopathy.   Neurological: He is alert and oriented to person, place, and time. No cranial nerve deficit. Coordination normal.   Skin: Skin is warm and dry. No rash noted. No erythema.   Psychiatric: He has a normal mood and affect. His behavior is normal. Judgment and thought content normal.   Nursing note and vitals reviewed.      Assessment:       1. Routine general medical examination at a health care facility        Plan:   1. Return to clinic in 1 year or sooner if needed.

## 2019-01-02 DIAGNOSIS — E29.1 MALE HYPOGONADISM: Primary | ICD-10-CM

## 2019-01-02 RX ORDER — TESTOSTERONE CYPIONATE 1000 MG/10ML
200 INJECTION, SOLUTION INTRAMUSCULAR
Qty: 10 ML | Refills: 0 | Status: SHIPPED | OUTPATIENT
Start: 2019-01-02 | End: 2019-01-07 | Stop reason: SDUPTHER

## 2019-01-02 NOTE — TELEPHONE ENCOUNTER
----- Message from Luzma Lagunas sent at 1/2/2019  9:13 AM CST -----  Contact: Patient  Type:  RX Refill Request    Who Called: Patient  Refill or New Rx:  Refill  RX Name and Strength:  testosterone cypionate (DEPOTESTOTERONE CYPIONATE) 100 mg/mL injection  How is the patient currently taking it? (ex. 1XDay):  Inject 200 mg into the muscle every 14 (fourteen) days. - Intramuscular  Is this a 30 day or 90 day RX:  na  Preferred Pharmacy with phone number:    [address] [address]  Phone: [phone] Fax: [phone]  Local or Mail Order: Local  Ordering Provider:  Dr iFdel Cunha  Best Call Back Number:  [phone]  Additional Information: Calling to request a refill. Please advise.

## 2019-01-07 DIAGNOSIS — E29.1 MALE HYPOGONADISM: ICD-10-CM

## 2019-01-07 RX ORDER — TESTOSTERONE CYPIONATE 1000 MG/10ML
200 INJECTION, SOLUTION INTRAMUSCULAR
Qty: 10 ML | Refills: 2 | Status: SHIPPED | OUTPATIENT
Start: 2019-01-07 | End: 2019-02-11 | Stop reason: DRUGHIGH

## 2019-01-07 NOTE — TELEPHONE ENCOUNTER
----- Message from Merced Dominguez sent at 1/7/2019  2:36 PM CST -----  Contact: Patient  Type:  RX Refill Request    Who Called:  Patient  Refill or New Rx:  refill  RX Name and Strength:  testosterone cypionate (DEPOTESTOTERONE CYPIONATE) 100 mg/mL injection  How is the patient currently taking it? (ex. 1XDay):  na  Is this a 30 day or 90 day RX:  60 months  Preferred Pharmacy with phone number:    [address] [address]  Phone: [phone] Fax: [phone]     Local or Mail Order:  local  Ordering Provider:  Dr. Cunha  [facility] Call Back Number: [phone]    Additional Information:  Patient needs this sent to [facility]. It was sent to [facility] on 1/2/19. Thank you!

## 2019-01-31 ENCOUNTER — LAB VISIT (OUTPATIENT)
Dept: LAB | Facility: HOSPITAL | Age: 54
End: 2019-01-31
Attending: UROLOGY
Payer: COMMERCIAL

## 2019-01-31 DIAGNOSIS — Z12.5 SCREENING FOR PROSTATE CANCER: ICD-10-CM

## 2019-01-31 DIAGNOSIS — E29.1 MALE HYPOGONADISM: ICD-10-CM

## 2019-01-31 LAB
COMPLEXED PSA SERPL-MCNC: 0.89 NG/ML
HCT VFR BLD AUTO: 51 %
HGB BLD-MCNC: 16.5 G/DL
TESTOST SERPL-MCNC: 496 NG/DL

## 2019-01-31 PROCEDURE — 36415 COLL VENOUS BLD VENIPUNCTURE: CPT | Mod: PO

## 2019-01-31 PROCEDURE — 84403 ASSAY OF TOTAL TESTOSTERONE: CPT

## 2019-01-31 PROCEDURE — 85014 HEMATOCRIT: CPT

## 2019-01-31 PROCEDURE — 84153 ASSAY OF PSA TOTAL: CPT

## 2019-01-31 PROCEDURE — 85018 HEMOGLOBIN: CPT

## 2019-02-11 ENCOUNTER — OFFICE VISIT (OUTPATIENT)
Dept: UROLOGY | Facility: CLINIC | Age: 54
End: 2019-02-11
Payer: COMMERCIAL

## 2019-02-11 VITALS
HEART RATE: 95 BPM | HEIGHT: 70 IN | DIASTOLIC BLOOD PRESSURE: 83 MMHG | SYSTOLIC BLOOD PRESSURE: 150 MMHG | BODY MASS INDEX: 31.07 KG/M2 | WEIGHT: 217 LBS

## 2019-02-11 DIAGNOSIS — R79.89 LOW TESTOSTERONE: Primary | ICD-10-CM

## 2019-02-11 DIAGNOSIS — N52.9 IMPOTENCE: ICD-10-CM

## 2019-02-11 LAB
BILIRUB SERPL-MCNC: NORMAL MG/DL
BLOOD URINE, POC: NORMAL
COLOR, POC UA: NORMAL
GLUCOSE UR QL STRIP: NORMAL
KETONES UR QL STRIP: NORMAL
LEUKOCYTE ESTERASE URINE, POC: NORMAL
NITRITE, POC UA: NORMAL
PH, POC UA: 7
PROTEIN, POC: NORMAL
SPECIFIC GRAVITY, POC UA: 1015
UROBILINOGEN, POC UA: NORMAL

## 2019-02-11 PROCEDURE — 3008F PR BODY MASS INDEX (BMI) DOCUMENTED: ICD-10-PCS | Mod: CPTII,S$GLB,, | Performed by: UROLOGY

## 2019-02-11 PROCEDURE — 3077F PR MOST RECENT SYSTOLIC BLOOD PRESSURE >= 140 MM HG: ICD-10-PCS | Mod: CPTII,S$GLB,, | Performed by: UROLOGY

## 2019-02-11 PROCEDURE — 3079F PR MOST RECENT DIASTOLIC BLOOD PRESSURE 80-89 MM HG: ICD-10-PCS | Mod: CPTII,S$GLB,, | Performed by: UROLOGY

## 2019-02-11 PROCEDURE — 99999 PR PBB SHADOW E&M-EST. PATIENT-LVL III: CPT | Mod: PBBFAC,,, | Performed by: UROLOGY

## 2019-02-11 PROCEDURE — 99213 OFFICE O/P EST LOW 20 MIN: CPT | Mod: 25,S$GLB,, | Performed by: UROLOGY

## 2019-02-11 PROCEDURE — 3077F SYST BP >= 140 MM HG: CPT | Mod: CPTII,S$GLB,, | Performed by: UROLOGY

## 2019-02-11 PROCEDURE — 3079F DIAST BP 80-89 MM HG: CPT | Mod: CPTII,S$GLB,, | Performed by: UROLOGY

## 2019-02-11 PROCEDURE — 99999 PR PBB SHADOW E&M-EST. PATIENT-LVL III: ICD-10-PCS | Mod: PBBFAC,,, | Performed by: UROLOGY

## 2019-02-11 PROCEDURE — 99213 PR OFFICE/OUTPT VISIT, EST, LEVL III, 20-29 MIN: ICD-10-PCS | Mod: 25,S$GLB,, | Performed by: UROLOGY

## 2019-02-11 PROCEDURE — 3008F BODY MASS INDEX DOCD: CPT | Mod: CPTII,S$GLB,, | Performed by: UROLOGY

## 2019-02-11 PROCEDURE — 81002 POCT URINE DIPSTICK WITHOUT MICROSCOPE: ICD-10-PCS | Mod: S$GLB,,, | Performed by: UROLOGY

## 2019-02-11 PROCEDURE — 81002 URINALYSIS NONAUTO W/O SCOPE: CPT | Mod: S$GLB,,, | Performed by: UROLOGY

## 2019-02-11 RX ORDER — BISACODYL 5 MG
TABLET, DELAYED RELEASE (ENTERIC COATED) ORAL
COMMUNITY
End: 2020-02-10

## 2019-02-11 RX ORDER — TESTOSTERONE CYPIONATE 200 MG/ML
150 INJECTION, SOLUTION INTRAMUSCULAR
Qty: 10 ML | Refills: 2 | Status: SHIPPED | OUTPATIENT
Start: 2019-02-11 | End: 2019-08-05 | Stop reason: SDUPTHER

## 2019-02-11 RX ORDER — HYDROCHLOROTHIAZIDE 12.5 MG/1
CAPSULE ORAL
COMMUNITY
End: 2020-02-10

## 2019-02-11 RX ORDER — OMEPRAZOLE 40 MG/1
CAPSULE, DELAYED RELEASE ORAL
COMMUNITY
End: 2020-02-10

## 2019-02-11 RX ORDER — RABEPRAZOLE SODIUM 20 MG/1
TABLET, DELAYED RELEASE ORAL
COMMUNITY
End: 2020-02-10

## 2019-02-11 NOTE — PROGRESS NOTES
Subjective:       Patient ID: Ron Weiss is a 53 y.o. male.    Chief Complaint: Follow-up (testosterone injection)    HPI     53-year-old with ED and low testosterone.  He is on IM testosterone replacement.  Current dose is 150 mg every 2 week.  He got a recent Rx refill and there was some confusion and RX filled at only 100mg per ml.  He has been giving the smaller incorrect dose (75mg) every.  He has noticed less energy lately.  Peak testosterone is 496.  PSA is 0.89.  He has no voiding complaints.  He denies hematuria and dysuria.  H&H are normal.  He has a family history of prostate cancer.  Urine dipstick shows negative for all components.    Review of Systems   Constitutional: Negative for fever.   Genitourinary: Negative for dysuria and hematuria.       Objective:      Physical Exam   Constitutional: He is oriented to person, place, and time. He appears well-developed and well-nourished.   Pulmonary/Chest: Effort normal.   Neurological: He is alert and oriented to person, place, and time.   Skin: No rash noted.   Psychiatric: He has a normal mood and affect.   Vitals reviewed.      Assessment:       1. Low testosterone    2. Impotence        Plan:       Low testosterone  -     POCT URINE DIPSTICK WITHOUT MICROSCOPE  -     Hematocrit; Future; Expected date: 08/14/2019  -     Hemoglobin; Future; Expected date: 08/14/2019  -     Testosterone; Future; Expected date: 08/14/2019    Impotence    Other orders  -     testosterone cypionate (DEPOTESTOTERONE CYPIONATE) 200 mg/mL injection; Inject 0.75 mLs (150 mg total) into the muscle every 14 (fourteen) days.  Dispense: 10 mL; Refill: 2      Resume 150 mg every 2 weeks.  RTC 6 months with labs

## 2019-07-31 ENCOUNTER — LAB VISIT (OUTPATIENT)
Dept: LAB | Facility: HOSPITAL | Age: 54
End: 2019-07-31
Attending: UROLOGY
Payer: COMMERCIAL

## 2019-07-31 DIAGNOSIS — R79.89 LOW TESTOSTERONE: ICD-10-CM

## 2019-07-31 LAB
HCT VFR BLD AUTO: 52.8 % (ref 40–54)
HGB BLD-MCNC: 17.2 G/DL (ref 14–18)
TESTOST SERPL-MCNC: 793 NG/DL (ref 304–1227)

## 2019-07-31 PROCEDURE — 85014 HEMATOCRIT: CPT

## 2019-07-31 PROCEDURE — 85018 HEMOGLOBIN: CPT

## 2019-07-31 PROCEDURE — 84403 ASSAY OF TOTAL TESTOSTERONE: CPT

## 2019-07-31 PROCEDURE — 36415 COLL VENOUS BLD VENIPUNCTURE: CPT | Mod: PO

## 2019-08-05 ENCOUNTER — OFFICE VISIT (OUTPATIENT)
Dept: UROLOGY | Facility: CLINIC | Age: 54
End: 2019-08-05
Payer: COMMERCIAL

## 2019-08-05 VITALS
WEIGHT: 221.56 LBS | HEART RATE: 99 BPM | SYSTOLIC BLOOD PRESSURE: 154 MMHG | DIASTOLIC BLOOD PRESSURE: 80 MMHG | BODY MASS INDEX: 31.72 KG/M2 | HEIGHT: 70 IN

## 2019-08-05 DIAGNOSIS — Z12.5 SCREENING FOR PROSTATE CANCER: ICD-10-CM

## 2019-08-05 DIAGNOSIS — Z80.42 FAMILY HISTORY OF PROSTATE CANCER: ICD-10-CM

## 2019-08-05 DIAGNOSIS — N52.9 IMPOTENCE: ICD-10-CM

## 2019-08-05 DIAGNOSIS — R79.89 LOW TESTOSTERONE: Primary | ICD-10-CM

## 2019-08-05 DIAGNOSIS — N40.0 BPH WITHOUT URINARY OBSTRUCTION: ICD-10-CM

## 2019-08-05 LAB
BILIRUB SERPL-MCNC: ABNORMAL MG/DL
BLOOD URINE, POC: ABNORMAL
COLOR, POC UA: ABNORMAL
GLUCOSE UR QL STRIP: ABNORMAL
KETONES UR QL STRIP: ABNORMAL
LEUKOCYTE ESTERASE URINE, POC: ABNORMAL
NITRITE, POC UA: ABNORMAL
PH, POC UA: 6
PROTEIN, POC: ABNORMAL
SPECIFIC GRAVITY, POC UA: 1025
UROBILINOGEN, POC UA: ABNORMAL

## 2019-08-05 PROCEDURE — 81002 URINALYSIS NONAUTO W/O SCOPE: CPT | Mod: S$GLB,,, | Performed by: UROLOGY

## 2019-08-05 PROCEDURE — 99214 OFFICE O/P EST MOD 30 MIN: CPT | Mod: 25,S$GLB,, | Performed by: UROLOGY

## 2019-08-05 PROCEDURE — 3008F BODY MASS INDEX DOCD: CPT | Mod: CPTII,S$GLB,, | Performed by: UROLOGY

## 2019-08-05 PROCEDURE — 3077F PR MOST RECENT SYSTOLIC BLOOD PRESSURE >= 140 MM HG: ICD-10-PCS | Mod: CPTII,S$GLB,, | Performed by: UROLOGY

## 2019-08-05 PROCEDURE — 99214 PR OFFICE/OUTPT VISIT, EST, LEVL IV, 30-39 MIN: ICD-10-PCS | Mod: 25,S$GLB,, | Performed by: UROLOGY

## 2019-08-05 PROCEDURE — 3077F SYST BP >= 140 MM HG: CPT | Mod: CPTII,S$GLB,, | Performed by: UROLOGY

## 2019-08-05 PROCEDURE — 81002 POCT URINE DIPSTICK WITHOUT MICROSCOPE: ICD-10-PCS | Mod: S$GLB,,, | Performed by: UROLOGY

## 2019-08-05 PROCEDURE — 3079F DIAST BP 80-89 MM HG: CPT | Mod: CPTII,S$GLB,, | Performed by: UROLOGY

## 2019-08-05 PROCEDURE — 99999 PR PBB SHADOW E&M-EST. PATIENT-LVL III: ICD-10-PCS | Mod: PBBFAC,,, | Performed by: UROLOGY

## 2019-08-05 PROCEDURE — 99999 PR PBB SHADOW E&M-EST. PATIENT-LVL III: CPT | Mod: PBBFAC,,, | Performed by: UROLOGY

## 2019-08-05 PROCEDURE — 3079F PR MOST RECENT DIASTOLIC BLOOD PRESSURE 80-89 MM HG: ICD-10-PCS | Mod: CPTII,S$GLB,, | Performed by: UROLOGY

## 2019-08-05 PROCEDURE — 3008F PR BODY MASS INDEX (BMI) DOCUMENTED: ICD-10-PCS | Mod: CPTII,S$GLB,, | Performed by: UROLOGY

## 2019-08-05 RX ORDER — TESTOSTERONE CYPIONATE 200 MG/ML
150 INJECTION, SOLUTION INTRAMUSCULAR
Qty: 10 ML | Refills: 2 | Status: SHIPPED | OUTPATIENT
Start: 2019-08-05 | End: 2020-08-10 | Stop reason: SDUPTHER

## 2019-08-05 NOTE — PROGRESS NOTES
Subjective:       Patient ID: Ron Weiss is a 54 y.o. male.    Chief Complaint: Low Testosterone (follow up with labs)    HPI     54-year-old with ED and low testosterone.  He is on IM testosterone replacement.  Current dose is 150 mg every 2 week.  Peak testosterone is 793.  Last PSA is 0.89.  He has no voiding complaints.  He denies hematuria and dysuria.  H&H are normal.  He has a strong family history of prostate cancer.  He was to monitor his PSA every 6 months.  Urine dipstick shows negative for all components.    Review of Systems   Constitutional: Negative for fever.   Genitourinary: Negative for dysuria and hematuria.       Objective:      Physical Exam   Constitutional: He is oriented to person, place, and time. He appears well-developed and well-nourished.   HENT:   Head: Normocephalic and atraumatic.   Eyes: Conjunctivae are normal.   Cardiovascular: Normal rate.   Pulmonary/Chest: Effort normal.   Genitourinary: Rectal exam shows no mass and anal tone normal. Prostate is enlarged (40g, s/s/a). Prostate is not tender.   Musculoskeletal: Normal range of motion. He exhibits no edema.   Neurological: He is alert and oriented to person, place, and time.   Skin: Skin is warm and dry. No rash noted.   Psychiatric: He has a normal mood and affect.   Vitals reviewed.      Assessment:       1. Low testosterone    2. BPH without urinary obstruction    3. Family history of prostate cancer    4. Impotence    5. Screening for prostate cancer        Plan:       Low testosterone  -     POCT URINE DIPSTICK WITHOUT MICROSCOPE  -     Hemoglobin; Future; Expected date: 02/05/2020  -     Hematocrit; Future; Expected date: 02/05/2020  -     Testosterone; Future; Expected date: 02/05/2020    BPH without urinary obstruction    Family history of prostate cancer    Impotence    Screening for prostate cancer  -     PSA, Screening; Future; Expected date: 08/05/2019    Other orders  -     testosterone cypionate  (DEPOTESTOTERONE CYPIONATE) 200 mg/mL injection; Inject 0.75 mLs (150 mg total) into the muscle every 14 (fourteen) days.  Dispense: 10 mL; Refill: 2

## 2019-09-20 DIAGNOSIS — Z00.00 ROUTINE GENERAL MEDICAL EXAMINATION AT A HEALTH CARE FACILITY: Primary | ICD-10-CM

## 2019-10-01 ENCOUNTER — PATIENT MESSAGE (OUTPATIENT)
Dept: INTERNAL MEDICINE | Facility: CLINIC | Age: 54
End: 2019-10-01

## 2019-10-01 ENCOUNTER — OFFICE VISIT (OUTPATIENT)
Dept: INTERNAL MEDICINE | Facility: CLINIC | Age: 54
End: 2019-10-01

## 2019-10-01 ENCOUNTER — CLINICAL SUPPORT (OUTPATIENT)
Dept: INTERNAL MEDICINE | Facility: CLINIC | Age: 54
End: 2019-10-01

## 2019-10-01 ENCOUNTER — HOSPITAL ENCOUNTER (OUTPATIENT)
Dept: CARDIOLOGY | Facility: CLINIC | Age: 54
Discharge: HOME | End: 2019-10-01
Payer: COMMERCIAL

## 2019-10-01 ENCOUNTER — HOSPITAL ENCOUNTER (OUTPATIENT)
Dept: PULMONOLOGY | Facility: CLINIC | Age: 54
Discharge: HOME | End: 2019-10-01

## 2019-10-01 ENCOUNTER — HOSPITAL ENCOUNTER (OUTPATIENT)
Dept: RADIOLOGY | Facility: HOSPITAL | Age: 54
Discharge: HOME | End: 2019-10-01
Attending: INTERNAL MEDICINE

## 2019-10-01 ENCOUNTER — CLINICAL SUPPORT (OUTPATIENT)
Dept: AUDIOLOGY | Facility: CLINIC | Age: 54
End: 2019-10-01

## 2019-10-01 VITALS
HEART RATE: 91 BPM | DIASTOLIC BLOOD PRESSURE: 84 MMHG | BODY MASS INDEX: 31.7 KG/M2 | OXYGEN SATURATION: 97 % | SYSTOLIC BLOOD PRESSURE: 140 MMHG | WEIGHT: 220.88 LBS

## 2019-10-01 DIAGNOSIS — Z00.00 ROUTINE GENERAL MEDICAL EXAMINATION AT A HEALTH CARE FACILITY: ICD-10-CM

## 2019-10-01 DIAGNOSIS — E29.1 MALE HYPOGONADISM: ICD-10-CM

## 2019-10-01 DIAGNOSIS — R06.02 SOB (SHORTNESS OF BREATH): ICD-10-CM

## 2019-10-01 DIAGNOSIS — K21.00 GASTROESOPHAGEAL REFLUX DISEASE WITH ESOPHAGITIS: ICD-10-CM

## 2019-10-01 DIAGNOSIS — Z00.00 ROUTINE GENERAL MEDICAL EXAMINATION AT A HEALTH CARE FACILITY: Primary | ICD-10-CM

## 2019-10-01 DIAGNOSIS — Z01.10 EXAMINATION OF EARS AND HEARING: Primary | ICD-10-CM

## 2019-10-01 DIAGNOSIS — G47.33 OBSTRUCTIVE SLEEP APNEA: Chronic | ICD-10-CM

## 2019-10-01 DIAGNOSIS — Z00.00 ROUTINE PHYSICAL EXAMINATION: Primary | ICD-10-CM

## 2019-10-01 DIAGNOSIS — J06.9 UPPER RESPIRATORY TRACT INFECTION, UNSPECIFIED TYPE: ICD-10-CM

## 2019-10-01 LAB
ALBUMIN SERPL BCP-MCNC: 4.5 G/DL (ref 3.5–5.2)
ALP SERPL-CCNC: 73 U/L (ref 55–135)
ALT SERPL W/O P-5'-P-CCNC: 42 U/L (ref 10–44)
ANION GAP SERPL CALC-SCNC: 9 MMOL/L (ref 8–16)
AST SERPL-CCNC: 29 U/L (ref 10–40)
BACTERIA #/AREA URNS AUTO: NORMAL /HPF
BASOPHILS # BLD AUTO: 0.04 K/UL (ref 0–0.2)
BASOPHILS NFR BLD: 0.7 % (ref 0–1.9)
BILIRUB SERPL-MCNC: 0.7 MG/DL (ref 0.1–1)
BILIRUB UR QL STRIP: NEGATIVE
BUN SERPL-MCNC: 19 MG/DL (ref 6–20)
CALCIUM SERPL-MCNC: 8.8 MG/DL (ref 8.7–10.5)
CHLORIDE SERPL-SCNC: 104 MMOL/L (ref 95–110)
CHOLEST SERPL-MCNC: 200 MG/DL (ref 120–199)
CHOLEST/HDLC SERPL: 4.5 {RATIO} (ref 2–5)
CLARITY UR REFRACT.AUTO: CLEAR
CO2 SERPL-SCNC: 25 MMOL/L (ref 23–29)
COLOR UR AUTO: YELLOW
CREAT SERPL-MCNC: 1.2 MG/DL (ref 0.5–1.4)
DIFFERENTIAL METHOD: ABNORMAL
EOSINOPHIL # BLD AUTO: 0.1 K/UL (ref 0–0.5)
EOSINOPHIL NFR BLD: 1.8 % (ref 0–8)
ERYTHROCYTE [DISTWIDTH] IN BLOOD BY AUTOMATED COUNT: 13.3 % (ref 11.5–14.5)
EST. GFR  (AFRICAN AMERICAN): >60 ML/MIN/1.73 M^2
EST. GFR  (NON AFRICAN AMERICAN): >60 ML/MIN/1.73 M^2
GGT SERPL-CCNC: 59 U/L (ref 8–55)
GLUCOSE SERPL-MCNC: 105 MG/DL (ref 70–110)
GLUCOSE UR QL STRIP: NEGATIVE
HCT VFR BLD AUTO: 54.3 % (ref 40–54)
HDLC SERPL-MCNC: 44 MG/DL (ref 40–75)
HDLC SERPL: 22 % (ref 20–50)
HGB BLD-MCNC: 17.2 G/DL (ref 14–18)
HGB UR QL STRIP: NEGATIVE
IGE SERPL-ACNC: 82 IU/ML (ref 0–100)
IMM GRANULOCYTES # BLD AUTO: 0.05 K/UL (ref 0–0.04)
IMM GRANULOCYTES NFR BLD AUTO: 0.9 % (ref 0–0.5)
IRON SERPL-MCNC: 148 UG/DL (ref 45–160)
KETONES UR QL STRIP: NEGATIVE
LDH SERPL L TO P-CCNC: 189 U/L (ref 110–260)
LDLC SERPL CALC-MCNC: 130.2 MG/DL (ref 63–159)
LEUKOCYTE ESTERASE UR QL STRIP: NEGATIVE
LYMPHOCYTES # BLD AUTO: 1.4 K/UL (ref 1–4.8)
LYMPHOCYTES NFR BLD: 25.2 % (ref 18–48)
MCH RBC QN AUTO: 27 PG (ref 27–31)
MCHC RBC AUTO-ENTMCNC: 31.7 G/DL (ref 32–36)
MCV RBC AUTO: 85 FL (ref 82–98)
MICROSCOPIC COMMENT: NORMAL
MONOCYTES # BLD AUTO: 0.5 K/UL (ref 0.3–1)
MONOCYTES NFR BLD: 7.9 % (ref 4–15)
NEUTROPHILS # BLD AUTO: 3.6 K/UL (ref 1.8–7.7)
NEUTROPHILS NFR BLD: 63.5 % (ref 38–73)
NITRITE UR QL STRIP: NEGATIVE
NONHDLC SERPL-MCNC: 156 MG/DL
NRBC BLD-RTO: 0 /100 WBC
PH UR STRIP: 6 [PH] (ref 5–8)
PHOSPHATE SERPL-MCNC: 2.2 MG/DL (ref 2.7–4.5)
PLATELET # BLD AUTO: 207 K/UL (ref 150–350)
PMV BLD AUTO: 9.9 FL (ref 9.2–12.9)
POTASSIUM SERPL-SCNC: 4.1 MMOL/L (ref 3.5–5.1)
PROT SERPL-MCNC: 8.4 G/DL (ref 6–8.4)
PROT SERPL-MCNC: 8.7 G/DL (ref 6–8.4)
PROT UR QL STRIP: NEGATIVE
RBC # BLD AUTO: 6.37 M/UL (ref 4.6–6.2)
RBC #/AREA URNS AUTO: 0 /HPF (ref 0–4)
SODIUM SERPL-SCNC: 138 MMOL/L (ref 136–145)
SP GR UR STRIP: 1.01 (ref 1–1.03)
TRIGL SERPL-MCNC: 129 MG/DL (ref 30–150)
URATE SERPL-MCNC: 6.6 MG/DL (ref 3.4–7)
URN SPEC COLLECT METH UR: NORMAL
WBC # BLD AUTO: 5.71 K/UL (ref 3.9–12.7)
WBC #/AREA URNS AUTO: 1 /HPF (ref 0–5)

## 2019-10-01 PROCEDURE — 99999 PR PBB SHADOW E&M-EST. PATIENT-LVL III: CPT | Mod: PBBFAC,,, | Performed by: INTERNAL MEDICINE

## 2019-10-01 PROCEDURE — 94010 BREATHING CAPACITY TEST: CPT | Mod: S$GLB,,, | Performed by: INTERNAL MEDICINE

## 2019-10-01 PROCEDURE — 71046 X-RAY EXAM CHEST 2 VIEWS: CPT | Mod: TC,FY

## 2019-10-01 PROCEDURE — 81001 URINALYSIS AUTO W/SCOPE: CPT

## 2019-10-01 PROCEDURE — 93005 ELECTROCARDIOGRAM TRACING: CPT | Mod: PBBFAC | Performed by: INTERNAL MEDICINE

## 2019-10-01 PROCEDURE — 84550 ASSAY OF BLOOD/URIC ACID: CPT

## 2019-10-01 PROCEDURE — 80061 LIPID PANEL: CPT

## 2019-10-01 PROCEDURE — 93010 ELECTROCARDIOGRAM REPORT: CPT | Mod: S$PBB,,, | Performed by: INTERNAL MEDICINE

## 2019-10-01 PROCEDURE — 85025 COMPLETE CBC W/AUTO DIFF WBC: CPT

## 2019-10-01 PROCEDURE — 99396 PR PREVENTIVE VISIT,EST,40-64: ICD-10-PCS | Mod: S$GLB,,, | Performed by: INTERNAL MEDICINE

## 2019-10-01 PROCEDURE — 36415 COLL VENOUS BLD VENIPUNCTURE: CPT

## 2019-10-01 PROCEDURE — 92552 PURE TONE AUDIOMETRY AIR: CPT | Mod: S$GLB,,, | Performed by: PHYSICIAN ASSISTANT

## 2019-10-01 PROCEDURE — 82977 ASSAY OF GGT: CPT

## 2019-10-01 PROCEDURE — 99999 PR PBB SHADOW E&M-EST. PATIENT-LVL I: CPT | Mod: PBBFAC,,, | Performed by: PHYSICIAN ASSISTANT

## 2019-10-01 PROCEDURE — 82785 ASSAY OF IGE: CPT

## 2019-10-01 PROCEDURE — 71046 X-RAY EXAM CHEST 2 VIEWS: CPT | Mod: 26,,, | Performed by: RADIOLOGY

## 2019-10-01 PROCEDURE — 71046 XR CHEST PA AND LATERAL: ICD-10-PCS | Mod: 26,,, | Performed by: RADIOLOGY

## 2019-10-01 PROCEDURE — 83540 ASSAY OF IRON: CPT

## 2019-10-01 PROCEDURE — 99396 PREV VISIT EST AGE 40-64: CPT | Mod: S$GLB,,, | Performed by: INTERNAL MEDICINE

## 2019-10-01 PROCEDURE — 99999 PR PBB SHADOW E&M-EST. PATIENT-LVL I: ICD-10-PCS | Mod: PBBFAC,,, | Performed by: PHYSICIAN ASSISTANT

## 2019-10-01 PROCEDURE — 93010 EKG 12-LEAD: ICD-10-PCS | Mod: S$PBB,,, | Performed by: INTERNAL MEDICINE

## 2019-10-01 PROCEDURE — 99999 PR PBB SHADOW E&M-EST. PATIENT-LVL III: ICD-10-PCS | Mod: PBBFAC,,, | Performed by: INTERNAL MEDICINE

## 2019-10-01 PROCEDURE — 92552 PR PURE TONE AUDIOMETRY, AIR: ICD-10-PCS | Mod: S$GLB,,, | Performed by: PHYSICIAN ASSISTANT

## 2019-10-01 PROCEDURE — 83615 LACTATE (LD) (LDH) ENZYME: CPT

## 2019-10-01 PROCEDURE — 84100 ASSAY OF PHOSPHORUS: CPT

## 2019-10-01 PROCEDURE — 94010 BREATHING CAPACITY TEST: ICD-10-PCS | Mod: S$GLB,,, | Performed by: INTERNAL MEDICINE

## 2019-10-01 PROCEDURE — 80053 COMPREHEN METABOLIC PANEL: CPT

## 2019-10-01 PROCEDURE — 84155 ASSAY OF PROTEIN SERUM: CPT

## 2019-10-01 NOTE — PROGRESS NOTES
Executive Health Audio:    Test results indicated a mild, high frequency hearing loss bilaterally, worse AS.    Recommend:  1. Annual evaluations  2. Hearing protection in noise

## 2019-10-01 NOTE — LETTER
October 4, 2019    Ron Weiss  [id number] [address] [facility] [address]             [facility]  [address]  [place] [address]  Phone: [phone]  Fax: [phone] Dear Mr. Weiss:        Thank you for allowing me to serve you and perform your Executive Health exam on 10/1/2019.  This letter will serve a brief summary of the history, physical findings, and laboratory/studies performed and recommendations at that time.    Reason for Visit: Executive Health Preventive Physical Examination    Subjective:       Patient ID: Ron Weiss is a 54 y.o. male.    Chief Complaint: Executive Health    Patient is new to me, here for executive health physical.  He is a 54-year-old male who works for the [employer].  He sees a urologist for prostate checked, cancer screening and follow-up of testosterone.  Overall he says that has been stable.  He had audiogram and spirometry that are not back yet.  EKG shows stable minor changes compared to last year.  He says his EKG is been abnormal for years but he has had stress tests that have been normal.  His dad does have CAD.  His phosphorus was slightly low but has been low for years.  His testosterone was 1 point above normal he will work on his diet.  He takes a multivitamin he will see if he can boost his phosphorus slightly.  Blood pressure was borderline at 1 40/84.  He does have a follow-up with his regular PCP in a week or 2.  Other labs and chest x-ray were stable.    Review of Systems   Constitutional: Negative for chills, fatigue, fever and unexpected weight change.   HENT: Positive for congestion. Negative for nosebleeds and trouble swallowing.    Eyes: Negative for pain and visual disturbance.   Respiratory: Positive for cough (Cough for a week or 2 with minor congestion). Negative for shortness of breath and wheezing.    Cardiovascular: Negative for chest pain and palpitations.   Gastrointestinal: Negative  for abdominal pain, constipation, diarrhea, nausea and vomiting.   Genitourinary: Negative for difficulty urinating and hematuria.   Musculoskeletal: Negative for neck pain.   Skin: Negative for rash.   Neurological: Negative for dizziness and headaches.   Hematological: Does not bruise/bleed easily.   Psychiatric/Behavioral: Negative for dysphoric mood, sleep disturbance and suicidal ideas.       Objective:      Physical Exam   Constitutional: He is oriented to person, place, and time. He appears well-developed and well-nourished. No distress.   HENT:   Head: Normocephalic and atraumatic.   Right Ear: External ear normal.   Left Ear: External ear normal.   Mouth/Throat: Oropharynx is clear and moist. No oropharyngeal exudate.   TM's clear, pharynx clear   Eyes: Pupils are equal, round, and reactive to light. Conjunctivae and EOM are normal. No scleral icterus.   Neck: Normal range of motion. Neck supple. No thyromegaly present.   No supraclavicular nodes palpated   Cardiovascular: Normal rate, regular rhythm and normal heart sounds.   No murmur heard.  Pulmonary/Chest: Effort normal and breath sounds normal. He has no wheezes.   Abdominal: Soft. Bowel sounds are normal. He exhibits no mass. There is no tenderness.   Musculoskeletal: He exhibits no edema.   Lymphadenopathy:     He has no cervical adenopathy.   Neurological: He is alert and oriented to person, place, and time.   Skin: No rash noted. No erythema. No pallor.   Psychiatric: He has a normal mood and affect. His behavior is normal.       Assessment:       1. Routine physical examination    2. Obstructive sleep apnea    3. Gastroesophageal reflux disease with esophagitis    4. Male hypogonadism    5. Upper respiratory tract infection, unspecified type        Plan:       Ron was seen today for [facility].    Diagnoses and all orders for this visit:    Routine physical examination    Obstructive sleep apnea    Gastroesophageal reflux disease with  esophagitis    Male hypogonadism    Upper respiratory tract infection, unspecified type        consider Mucinex    Review pending studies.  Follow-up annually and with PCP    EKG and Audiology eval are stable. Waiting on the Spirometry.     Addendum: Spirometry is normal.      Labs:  Results for orders placed or performed in visit on 10/01/19   Comprehensive metabolic panel   Result Value Ref Range    Sodium 138 136 - 145 mmol/L    Potassium 4.1 3.5 - 5.1 mmol/L    Chloride 104 95 - 110 mmol/L    CO2 25 23 - 29 mmol/L    Glucose 105 70 - 110 mg/dL    BUN, Bld 19 6 - 20 mg/dL    Creatinine 1.2 0.5 - 1.4 mg/dL    Calcium 8.8 8.7 - 10.5 mg/dL    Total Protein 8.4 6.0 - 8.4 g/dL    Albumin 4.5 3.5 - 5.2 g/dL    Total Bilirubin 0.7 0.1 - 1.0 mg/dL    Alkaline Phosphatase 73 55 - 135 U/L    AST 29 10 - 40 U/L    ALT 42 10 - 44 U/L    Anion Gap 9 8 - 16 mmol/L    eGFR if African American >60.0 >60 mL/min/1.73 m^2    eGFR if non African American >60.0 >60 mL/min/1.73 m^2   Lipid panel   Result Value Ref Range    Cholesterol 200 (H) 120 - 199 mg/dL    Triglycerides 129 30 - 150 mg/dL    HDL 44 40 - 75 mg/dL    LDL Cholesterol 130.2 63.0 - 159.0 mg/dL    Hdl/Cholesterol Ratio 22.0 20.0 - 50.0 %    Total Cholesterol/HDL Ratio 4.5 2.0 - 5.0    Non-HDL Cholesterol 156 mg/dL   CBC auto differential   Result Value Ref Range    WBC 5.71 3.90 - 12.70 K/uL    RBC 6.37 (H) 4.60 - 6.20 M/uL    Hemoglobin 17.2 14.0 - 18.0 g/dL    Hematocrit 54.3 (H) 40.0 - 54.0 %    Mean Corpuscular Volume 85 82 - 98 fL    Mean Corpuscular Hemoglobin 27.0 27.0 - 31.0 pg    Mean Corpuscular Hemoglobin Conc 31.7 (L) 32.0 - 36.0 g/dL    RDW 13.3 11.5 - 14.5 %    Platelets 207 150 - 350 K/uL    MPV 9.9 9.2 - 12.9 fL    Immature Granulocytes 0.9 (H) 0.0 - 0.5 %    Gran # (ANC) 3.6 1.8 - 7.7 K/uL    Immature Grans (Abs) 0.05 (H) 0.00 - 0.04 K/uL    Lymph # 1.4 1.0 - 4.8 K/uL    Mono # 0.5 0.3 - 1.0 K/uL    Eos # 0.1 0.0 - 0.5 K/uL    Baso # 0.04 0.00 - 0.20  K/uL    nRBC 0 0 /100 WBC    Gran% 63.5 38.0 - 73.0 %    Lymph% 25.2 18.0 - 48.0 %    Mono% 7.9 4.0 - 15.0 %    Eosinophil% 1.8 0.0 - 8.0 %    Basophil% 0.7 0.0 - 1.9 %    Differential Method Automated    Gamma GT   Result Value Ref Range    GGT 59 (H) 8 - 55 U/L   IgE   Result Value Ref Range    IgE 82 0 - 100 IU/mL   Iron   Result Value Ref Range    Iron 148 45 - 160 ug/dL   Lactate dehydrogenase   Result Value Ref Range     110 - 260 U/L   Phosphorus   Result Value Ref Range    Phosphorus 2.2 (L) 2.7 - 4.5 mg/dL   Protein, total   Result Value Ref Range    Total Protein 8.7 (H) 6.0 - 8.4 g/dL   Uric acid   Result Value Ref Range    Uric Acid 6.6 3.4 - 7.0 mg/dL   Urinalysis   Result Value Ref Range    Specimen UA Urine, Unspecified     Color, UA Yellow Yellow, Straw, Ingris    Appearance, UA Clear Clear    pH, UA 6.0 5.0 - 8.0    Specific Gravity, UA 1.015 1.005 - 1.030    Protein, UA Negative Negative    Glucose, UA Negative Negative    Ketones, UA Negative Negative    Bilirubin (UA) Negative Negative    Occult Blood UA Negative Negative    Nitrite, UA Negative Negative    Leukocytes, UA Negative Negative   Urinalysis Microscopic   Result Value Ref Range    RBC, UA 0 0 - 4 /hpf    WBC, UA 1 0 - 5 /hpf    Bacteria Rare None-Occ /hpf    Microscopic Comment SEE COMMENT       Audiology and Spirometry were within acceptable range.     Assessment/Recommendations:  Routine Health Maintenance    At this time, you appear to be in good medical condition.  I look forward to seeing you again next year.  Please contact me should you have any questions or concerns regarding physical findings, or my recommendations.              If you have any questions or concerns, please don't hesitate to call.    Sincerely,        Rebel Wilson MD

## 2019-10-01 NOTE — PROGRESS NOTES
Subjective:       Patient ID: Ron Weiss is a 54 y.o. male.    Chief Complaint: Executive Health    Patient is new to me, here for executive health physical.  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  He sees a urologist for prostate checked, cancer screening and follow-up of testosterone.  Overall he says that has been stable.  He had audiogram and spirometry that are not back yet.  EKG shows stable minor changes compared to last year.  He says his EKG is been abnormal for years but he has had stress tests that have been normal.  His dad does have CAD.  His phosphorus was slightly low but has been low for years.  His testosterone was 1 point above normal he will work on his diet.  He takes a multivitamin he will see if he can boost his phosphorus slightly.  Blood pressure was borderline at 1 40/84.  He does have a follow-up with his regular PCP in a week or 2.  Other labs and chest x-ray were stable.    Review of Systems   Constitutional: Negative for chills, fatigue, fever and unexpected weight change.   HENT: Positive for congestion. Negative for nosebleeds and trouble swallowing.    Eyes: Negative for pain and visual disturbance.   Respiratory: Positive for cough (Cough for a week or 2 with minor congestion). Negative for shortness of breath and wheezing.    Cardiovascular: Negative for chest pain and palpitations.   Gastrointestinal: Negative for abdominal pain, constipation, diarrhea, nausea and vomiting.   Genitourinary: Negative for difficulty urinating and hematuria.   Musculoskeletal: Negative for neck pain.   Skin: Negative for rash.   Neurological: Negative for dizziness and headaches.   Hematological: Does not bruise/bleed easily.   Psychiatric/Behavioral: Negative for dysphoric mood, sleep disturbance and suicidal ideas.       Objective:      Physical Exam   Constitutional: He is oriented to person, place, and time. He appears well-developed and well-nourished.  No distress.   HENT:   Head: Normocephalic and atraumatic.   Right Ear: External ear normal.   Left Ear: External ear normal.   Mouth/Throat: Oropharynx is clear and moist. No oropharyngeal exudate.   TM's clear, pharynx clear   Eyes: Pupils are equal, round, and reactive to light. Conjunctivae and EOM are normal. No scleral icterus.   Neck: Normal range of motion. Neck supple. No thyromegaly present.   No supraclavicular nodes palpated   Cardiovascular: Normal rate, regular rhythm and normal heart sounds.   No murmur heard.  Pulmonary/Chest: Effort normal and breath sounds normal. He has no wheezes.   Abdominal: Soft. Bowel sounds are normal. He exhibits no mass. There is no tenderness.   Musculoskeletal: He exhibits no edema.   Lymphadenopathy:     He has no cervical adenopathy.   Neurological: He is alert and oriented to person, place, and time.   Skin: No rash noted. No erythema. No pallor.   Psychiatric: He has a normal mood and affect. His behavior is normal.       Assessment:       1. Routine physical examination    2. Obstructive sleep apnea    3. Gastroesophageal reflux disease with esophagitis    4. Male hypogonadism    5. Upper respiratory tract infection, unspecified type        Plan:       Ron was seen today for [facility].    Diagnoses and all orders for this visit:    Routine physical examination    Obstructive sleep apnea    Gastroesophageal reflux disease with esophagitis    Male hypogonadism    Upper respiratory tract infection, unspecified type        consider Mucinex    Review pending studies.  Follow-up annually and with PCP    EKG and Audiology eval are stable. Waiting on the Spirometry.     Addendum: Spirometry is normal.

## 2019-10-04 DIAGNOSIS — R06.02 SOB (SHORTNESS OF BREATH): Primary | ICD-10-CM

## 2020-02-05 ENCOUNTER — LAB VISIT (OUTPATIENT)
Dept: LAB | Facility: HOSPITAL | Age: 55
End: 2020-02-05
Attending: UROLOGY
Payer: COMMERCIAL

## 2020-02-05 DIAGNOSIS — R79.89 LOW TESTOSTERONE: ICD-10-CM

## 2020-02-05 DIAGNOSIS — Z12.5 SCREENING FOR PROSTATE CANCER: ICD-10-CM

## 2020-02-05 LAB
COMPLEXED PSA SERPL-MCNC: 0.94 NG/ML (ref 0–4)
HCT VFR BLD AUTO: 52 % (ref 40–54)
HGB BLD-MCNC: 16 G/DL (ref 14–18)
TESTOST SERPL-MCNC: 618 NG/DL (ref 304–1227)

## 2020-02-05 PROCEDURE — 85018 HEMOGLOBIN: CPT

## 2020-02-05 PROCEDURE — 36415 COLL VENOUS BLD VENIPUNCTURE: CPT | Mod: PO

## 2020-02-05 PROCEDURE — 85014 HEMATOCRIT: CPT

## 2020-02-05 PROCEDURE — 84403 ASSAY OF TOTAL TESTOSTERONE: CPT

## 2020-02-05 PROCEDURE — 84153 ASSAY OF PSA TOTAL: CPT

## 2020-02-10 ENCOUNTER — OFFICE VISIT (OUTPATIENT)
Dept: UROLOGY | Facility: CLINIC | Age: 55
End: 2020-02-10
Payer: COMMERCIAL

## 2020-02-10 VITALS
SYSTOLIC BLOOD PRESSURE: 144 MMHG | HEIGHT: 70 IN | BODY MASS INDEX: 32.61 KG/M2 | DIASTOLIC BLOOD PRESSURE: 86 MMHG | HEART RATE: 98 BPM | WEIGHT: 227.75 LBS

## 2020-02-10 DIAGNOSIS — N52.9 IMPOTENCE: ICD-10-CM

## 2020-02-10 DIAGNOSIS — Z80.42 FAMILY HISTORY OF PROSTATE CANCER: ICD-10-CM

## 2020-02-10 DIAGNOSIS — R79.89 LOW TESTOSTERONE: Primary | ICD-10-CM

## 2020-02-10 LAB
BILIRUB SERPL-MCNC: NORMAL MG/DL
BLOOD URINE, POC: NORMAL
COLOR, POC UA: >1.03
GLUCOSE UR QL STRIP: NORMAL
KETONES UR QL STRIP: NORMAL
LEUKOCYTE ESTERASE URINE, POC: NORMAL
NITRITE, POC UA: NORMAL
PH, POC UA: NORMAL
PROTEIN, POC: NORMAL
SPECIFIC GRAVITY, POC UA: 5.5
UROBILINOGEN, POC UA: NORMAL

## 2020-02-10 PROCEDURE — 99213 OFFICE O/P EST LOW 20 MIN: CPT | Mod: 25,S$GLB,, | Performed by: UROLOGY

## 2020-02-10 PROCEDURE — 81002 URINALYSIS NONAUTO W/O SCOPE: CPT | Mod: S$GLB,,, | Performed by: UROLOGY

## 2020-02-10 PROCEDURE — 3008F BODY MASS INDEX DOCD: CPT | Mod: CPTII,S$GLB,, | Performed by: UROLOGY

## 2020-02-10 PROCEDURE — 3008F PR BODY MASS INDEX (BMI) DOCUMENTED: ICD-10-PCS | Mod: CPTII,S$GLB,, | Performed by: UROLOGY

## 2020-02-10 PROCEDURE — 99213 PR OFFICE/OUTPT VISIT, EST, LEVL III, 20-29 MIN: ICD-10-PCS | Mod: 25,S$GLB,, | Performed by: UROLOGY

## 2020-02-10 PROCEDURE — 3077F PR MOST RECENT SYSTOLIC BLOOD PRESSURE >= 140 MM HG: ICD-10-PCS | Mod: CPTII,S$GLB,, | Performed by: UROLOGY

## 2020-02-10 PROCEDURE — 81002 POCT URINE DIPSTICK WITHOUT MICROSCOPE: ICD-10-PCS | Mod: S$GLB,,, | Performed by: UROLOGY

## 2020-02-10 PROCEDURE — 3079F PR MOST RECENT DIASTOLIC BLOOD PRESSURE 80-89 MM HG: ICD-10-PCS | Mod: CPTII,S$GLB,, | Performed by: UROLOGY

## 2020-02-10 PROCEDURE — 3079F DIAST BP 80-89 MM HG: CPT | Mod: CPTII,S$GLB,, | Performed by: UROLOGY

## 2020-02-10 PROCEDURE — 99999 PR PBB SHADOW E&M-EST. PATIENT-LVL III: ICD-10-PCS | Mod: PBBFAC,,, | Performed by: UROLOGY

## 2020-02-10 PROCEDURE — 99999 PR PBB SHADOW E&M-EST. PATIENT-LVL III: CPT | Mod: PBBFAC,,, | Performed by: UROLOGY

## 2020-02-10 PROCEDURE — 3077F SYST BP >= 140 MM HG: CPT | Mod: CPTII,S$GLB,, | Performed by: UROLOGY

## 2020-02-10 RX ORDER — SILDENAFIL 100 MG/1
100 TABLET, FILM COATED ORAL DAILY PRN
Qty: 10 TABLET | Refills: 11 | Status: SHIPPED | OUTPATIENT
Start: 2020-02-10 | End: 2022-09-26 | Stop reason: SDUPTHER

## 2020-02-10 NOTE — PROGRESS NOTES
Subjective:       Patient ID: Ron Weiss is a 54 y.o. male.    Chief Complaint: Low Testosterone    HPI     54-year-old with ED and low testosterone.  He is on IM testosterone replacement.  Current dose is 150 mg every 2 week.  Peak testosterone is 618.  Last PSA is 0.94.  He has no voiding complaints.  He denies hematuria and dysuria.  H&H are normal.  He has a strong family history of prostate cancer.  He wants to monitor his PSA every 6 months.  He has worsening erectile dysfunction since last seen.  He complains of weak erections and difficulty maintaining erections.  Urine dipstick shows negative for nitrites, leukocytes, glucose, protein, positive for trace blood.    Review of Systems   Constitutional: Negative for fever.   Genitourinary: Negative for dysuria and hematuria.       Objective:      Physical Exam   Constitutional: He is oriented to person, place, and time. He appears well-developed and well-nourished.   Pulmonary/Chest: Effort normal.   Abdominal: Soft.   Neurological: He is alert and oriented to person, place, and time.   Skin: No rash noted.   Psychiatric: He has a normal mood and affect.   Vitals reviewed.      Assessment:       1. Low testosterone    2. Impotence    3. Family history of prostate cancer        Plan:       Low testosterone  -     POCT URINE DIPSTICK WITHOUT MICROSCOPE  -     Testosterone; Future; Expected date: 08/12/2020  -     Hemoglobin; Future; Expected date: 08/12/2020  -     Hematocrit; Future; Expected date: 08/12/2020    Impotence    Family history of prostate cancer  -     Prostate Specific Antigen, Diagnostic; Future; Expected date: 08/12/2020    Other orders  -     sildenafil (VIAGRA) 100 MG tablet; Take 1 tablet (100 mg total) by mouth daily as needed.  Dispense: 10 tablet; Refill: 11      continue testosterone at current dose.  Trial sildenafil.  Follow up 6 months with repeat labs

## 2020-08-05 ENCOUNTER — LAB VISIT (OUTPATIENT)
Dept: LAB | Facility: HOSPITAL | Age: 55
End: 2020-08-05
Attending: UROLOGY
Payer: COMMERCIAL

## 2020-08-05 DIAGNOSIS — Z80.42 FAMILY HISTORY OF PROSTATE CANCER: ICD-10-CM

## 2020-08-05 DIAGNOSIS — R79.89 LOW TESTOSTERONE: ICD-10-CM

## 2020-08-05 LAB
COMPLEXED PSA SERPL-MCNC: 0.83 NG/ML (ref 0–4)
HCT VFR BLD AUTO: 52.1 % (ref 40–54)
HGB BLD-MCNC: 16.4 G/DL (ref 14–18)

## 2020-08-05 PROCEDURE — 36415 COLL VENOUS BLD VENIPUNCTURE: CPT | Mod: PO

## 2020-08-05 PROCEDURE — 85018 HEMOGLOBIN: CPT

## 2020-08-05 PROCEDURE — 84403 ASSAY OF TOTAL TESTOSTERONE: CPT

## 2020-08-05 PROCEDURE — 84153 ASSAY OF PSA TOTAL: CPT

## 2020-08-05 PROCEDURE — 85014 HEMATOCRIT: CPT

## 2020-08-06 LAB — TESTOST SERPL-MCNC: 877 NG/DL (ref 304–1227)

## 2020-08-10 ENCOUNTER — OFFICE VISIT (OUTPATIENT)
Dept: UROLOGY | Facility: CLINIC | Age: 55
End: 2020-08-10
Payer: COMMERCIAL

## 2020-08-10 VITALS
SYSTOLIC BLOOD PRESSURE: 132 MMHG | WEIGHT: 227.75 LBS | BODY MASS INDEX: 32.61 KG/M2 | HEART RATE: 79 BPM | DIASTOLIC BLOOD PRESSURE: 88 MMHG | HEIGHT: 70 IN

## 2020-08-10 DIAGNOSIS — N40.0 BPH WITHOUT URINARY OBSTRUCTION: ICD-10-CM

## 2020-08-10 DIAGNOSIS — Z80.42 FAMILY HISTORY OF PROSTATE CANCER: ICD-10-CM

## 2020-08-10 DIAGNOSIS — R79.89 LOW TESTOSTERONE LEVEL IN MALE: Primary | ICD-10-CM

## 2020-08-10 DIAGNOSIS — N52.9 IMPOTENCE: ICD-10-CM

## 2020-08-10 PROCEDURE — 99214 PR OFFICE/OUTPT VISIT, EST, LEVL IV, 30-39 MIN: ICD-10-PCS | Mod: S$GLB,,, | Performed by: UROLOGY

## 2020-08-10 PROCEDURE — 3075F PR MOST RECENT SYSTOLIC BLOOD PRESS GE 130-139MM HG: ICD-10-PCS | Mod: CPTII,S$GLB,, | Performed by: UROLOGY

## 2020-08-10 PROCEDURE — 3079F PR MOST RECENT DIASTOLIC BLOOD PRESSURE 80-89 MM HG: ICD-10-PCS | Mod: CPTII,S$GLB,, | Performed by: UROLOGY

## 2020-08-10 PROCEDURE — 99999 PR PBB SHADOW E&M-EST. PATIENT-LVL III: CPT | Mod: PBBFAC,,, | Performed by: UROLOGY

## 2020-08-10 PROCEDURE — 3075F SYST BP GE 130 - 139MM HG: CPT | Mod: CPTII,S$GLB,, | Performed by: UROLOGY

## 2020-08-10 PROCEDURE — 3079F DIAST BP 80-89 MM HG: CPT | Mod: CPTII,S$GLB,, | Performed by: UROLOGY

## 2020-08-10 PROCEDURE — 3008F PR BODY MASS INDEX (BMI) DOCUMENTED: ICD-10-PCS | Mod: CPTII,S$GLB,, | Performed by: UROLOGY

## 2020-08-10 PROCEDURE — 3008F BODY MASS INDEX DOCD: CPT | Mod: CPTII,S$GLB,, | Performed by: UROLOGY

## 2020-08-10 PROCEDURE — 99214 OFFICE O/P EST MOD 30 MIN: CPT | Mod: S$GLB,,, | Performed by: UROLOGY

## 2020-08-10 PROCEDURE — 99999 PR PBB SHADOW E&M-EST. PATIENT-LVL III: ICD-10-PCS | Mod: PBBFAC,,, | Performed by: UROLOGY

## 2020-08-10 RX ORDER — TESTOSTERONE CYPIONATE 200 MG/ML
150 INJECTION, SOLUTION INTRAMUSCULAR
Qty: 10 ML | Refills: 2 | Status: SHIPPED | OUTPATIENT
Start: 2020-08-10 | End: 2021-07-02

## 2020-08-10 NOTE — PROGRESS NOTES
Subjective:       Patient ID: Ron Weiss is a 55 y.o. male.    Chief Complaint: Follow-up (6mo)    HPI     55-year-old with ED and low testosterone.  He is on IM testosterone replacement.  Current dose is 150 mg every 2 week.  Peak testosterone is 877.  PSA is 0.83.  He has no voiding complaints.  He denies hematuria and dysuria.  H&H are normal.  He has a strong family history of prostate cancer.  He wants to monitor his PSA every 6 months.  He has worsening erectile dysfunction.    Past Medical History:   Diagnosis Date    GERD (gastroesophageal reflux disease)     Hypertension     Hypogonadism in male     Sleep apnea        Review of Systems   Constitutional: Negative for fever.   Genitourinary: Negative for dysuria and hematuria.       Objective:      Physical Exam  Vitals signs reviewed.   Constitutional:       Appearance: He is well-developed.   HENT:      Head: Normocephalic and atraumatic.   Eyes:      Conjunctiva/sclera: Conjunctivae normal.   Cardiovascular:      Rate and Rhythm: Normal rate.   Pulmonary:      Effort: Pulmonary effort is normal.   Genitourinary:     Prostate: Enlarged. Not tender.      Rectum: No mass. Normal anal tone.   Musculoskeletal: Normal range of motion.   Skin:     General: Skin is warm and dry.      Findings: No rash.   Neurological:      Mental Status: He is alert and oriented to person, place, and time.         Assessment:       1. Low testosterone level in male    2. BPH without urinary obstruction    3. Family history of prostate cancer    4. Impotence        Plan:       Low testosterone level in male  -     testosterone cypionate (DEPOTESTOTERONE CYPIONATE) 200 mg/mL injection; Inject 0.75 mLs (150 mg total) into the muscle every 14 (fourteen) days.  Dispense: 10 mL; Refill: 2  -     Testosterone; Future; Expected date: 02/10/2021  -     Hemoglobin; Future; Expected date: 02/10/2021  -     Hematocrit; Future; Expected date: 02/10/2021    BPH without urinary  obstruction  -     Prostate Specific Antigen, Diagnostic; Future; Expected date: 02/10/2021    Family history of prostate cancer  -     Prostate Specific Antigen, Diagnostic; Future; Expected date: 02/10/2021    Impotence      ten you testosterone at current dose.  Follow-up 6 months with repeat labs

## 2021-02-04 ENCOUNTER — LAB VISIT (OUTPATIENT)
Dept: LAB | Facility: HOSPITAL | Age: 56
End: 2021-02-04
Attending: UROLOGY
Payer: COMMERCIAL

## 2021-02-04 DIAGNOSIS — Z80.42 FAMILY HISTORY OF PROSTATE CANCER: ICD-10-CM

## 2021-02-04 DIAGNOSIS — N40.0 BPH WITHOUT URINARY OBSTRUCTION: ICD-10-CM

## 2021-02-04 DIAGNOSIS — R79.89 LOW TESTOSTERONE LEVEL IN MALE: ICD-10-CM

## 2021-02-04 LAB
HCT VFR BLD AUTO: 52.3 % (ref 40–54)
HGB BLD-MCNC: 16.7 G/DL (ref 14–18)

## 2021-02-04 PROCEDURE — 36415 COLL VENOUS BLD VENIPUNCTURE: CPT | Mod: PO

## 2021-02-04 PROCEDURE — 84403 ASSAY OF TOTAL TESTOSTERONE: CPT

## 2021-02-04 PROCEDURE — 85018 HEMOGLOBIN: CPT

## 2021-02-04 PROCEDURE — 85014 HEMATOCRIT: CPT

## 2021-02-04 PROCEDURE — 84153 ASSAY OF PSA TOTAL: CPT

## 2021-02-05 LAB
COMPLEXED PSA SERPL-MCNC: 1.1 NG/ML (ref 0–4)
TESTOST SERPL-MCNC: 1218 NG/DL (ref 304–1227)

## 2021-02-17 ENCOUNTER — OFFICE VISIT (OUTPATIENT)
Dept: UROLOGY | Facility: CLINIC | Age: 56
End: 2021-02-17
Payer: COMMERCIAL

## 2021-02-17 VITALS — HEIGHT: 70 IN | WEIGHT: 223.13 LBS | BODY MASS INDEX: 31.94 KG/M2

## 2021-02-17 DIAGNOSIS — R79.89 LOW TESTOSTERONE LEVEL IN MALE: Primary | ICD-10-CM

## 2021-02-17 DIAGNOSIS — Z12.5 SCREENING FOR PROSTATE CANCER: ICD-10-CM

## 2021-02-17 PROCEDURE — 3008F BODY MASS INDEX DOCD: CPT | Mod: CPTII,S$GLB,, | Performed by: UROLOGY

## 2021-02-17 PROCEDURE — 99213 OFFICE O/P EST LOW 20 MIN: CPT | Mod: S$GLB,,, | Performed by: UROLOGY

## 2021-02-17 PROCEDURE — 3008F PR BODY MASS INDEX (BMI) DOCUMENTED: ICD-10-PCS | Mod: CPTII,S$GLB,, | Performed by: UROLOGY

## 2021-02-17 PROCEDURE — 1126F PR PAIN SEVERITY QUANTIFIED, NO PAIN PRESENT: ICD-10-PCS | Mod: S$GLB,,, | Performed by: UROLOGY

## 2021-02-17 PROCEDURE — 99999 PR PBB SHADOW E&M-EST. PATIENT-LVL III: CPT | Mod: PBBFAC,,, | Performed by: UROLOGY

## 2021-02-17 PROCEDURE — 99999 PR PBB SHADOW E&M-EST. PATIENT-LVL III: ICD-10-PCS | Mod: PBBFAC,,, | Performed by: UROLOGY

## 2021-02-17 PROCEDURE — 99213 PR OFFICE/OUTPT VISIT, EST, LEVL III, 20-29 MIN: ICD-10-PCS | Mod: S$GLB,,, | Performed by: UROLOGY

## 2021-02-17 PROCEDURE — 1126F AMNT PAIN NOTED NONE PRSNT: CPT | Mod: S$GLB,,, | Performed by: UROLOGY

## 2021-03-08 DIAGNOSIS — Z00.00 ROUTINE GENERAL MEDICAL EXAMINATION AT A HEALTH CARE FACILITY: Primary | ICD-10-CM

## 2021-03-23 DIAGNOSIS — Z00.00 ROUTINE GENERAL MEDICAL EXAMINATION AT A HEALTH CARE FACILITY: Primary | ICD-10-CM

## 2021-03-30 ENCOUNTER — LAB VISIT (OUTPATIENT)
Dept: INTERNAL MEDICINE | Facility: CLINIC | Age: 56
End: 2021-03-30
Payer: COMMERCIAL

## 2021-03-30 DIAGNOSIS — Z01.818 PRE-OP TESTING: ICD-10-CM

## 2021-03-30 PROCEDURE — U0005 INFEC AGEN DETEC AMPLI PROBE: HCPCS | Performed by: INTERNAL MEDICINE

## 2021-03-30 PROCEDURE — U0003 INFECTIOUS AGENT DETECTION BY NUCLEIC ACID (DNA OR RNA); SEVERE ACUTE RESPIRATORY SYNDROME CORONAVIRUS 2 (SARS-COV-2) (CORONAVIRUS DISEASE [COVID-19]), AMPLIFIED PROBE TECHNIQUE, MAKING USE OF HIGH THROUGHPUT TECHNOLOGIES AS DESCRIBED BY CMS-2020-01-R: HCPCS | Performed by: INTERNAL MEDICINE

## 2021-03-31 LAB — SARS-COV-2 RNA RESP QL NAA+PROBE: NOT DETECTED

## 2021-04-01 ENCOUNTER — CLINICAL SUPPORT (OUTPATIENT)
Dept: AUDIOLOGY | Facility: CLINIC | Age: 56
End: 2021-04-01

## 2021-04-01 ENCOUNTER — CLINICAL SUPPORT (OUTPATIENT)
Dept: INTERNAL MEDICINE | Facility: CLINIC | Age: 56
End: 2021-04-01
Payer: COMMERCIAL

## 2021-04-01 ENCOUNTER — PATIENT MESSAGE (OUTPATIENT)
Dept: INTERNAL MEDICINE | Facility: CLINIC | Age: 56
End: 2021-04-01

## 2021-04-01 ENCOUNTER — HOSPITAL ENCOUNTER (OUTPATIENT)
Dept: CARDIOLOGY | Facility: CLINIC | Age: 56
Discharge: HOME | End: 2021-04-01

## 2021-04-01 ENCOUNTER — HOSPITAL ENCOUNTER (OUTPATIENT)
Dept: RADIOLOGY | Facility: HOSPITAL | Age: 56
Discharge: HOME | End: 2021-04-01
Attending: INTERNAL MEDICINE

## 2021-04-01 ENCOUNTER — OFFICE VISIT (OUTPATIENT)
Dept: INTERNAL MEDICINE | Facility: CLINIC | Age: 56
End: 2021-04-01

## 2021-04-01 ENCOUNTER — HOSPITAL ENCOUNTER (OUTPATIENT)
Dept: PULMONOLOGY | Facility: CLINIC | Age: 56
Discharge: HOME | End: 2021-04-01

## 2021-04-01 VITALS
HEIGHT: 70 IN | BODY MASS INDEX: 30.21 KG/M2 | HEART RATE: 87 BPM | TEMPERATURE: 99 F | DIASTOLIC BLOOD PRESSURE: 77 MMHG | WEIGHT: 211 LBS | SYSTOLIC BLOOD PRESSURE: 137 MMHG

## 2021-04-01 DIAGNOSIS — Z00.00 ENCOUNTER FOR ANNUAL HEALTH EXAMINATION: Primary | ICD-10-CM

## 2021-04-01 DIAGNOSIS — H91.91 HIGH-FREQUENCY HEARING LOSS OF RIGHT EAR: Primary | ICD-10-CM

## 2021-04-01 DIAGNOSIS — Z00.00 ROUTINE GENERAL MEDICAL EXAMINATION AT A HEALTH CARE FACILITY: ICD-10-CM

## 2021-04-01 DIAGNOSIS — Z00.00 ANNUAL PHYSICAL EXAM: Primary | ICD-10-CM

## 2021-04-01 LAB
25(OH)D3+25(OH)D2 SERPL-MCNC: 50 NG/ML (ref 30–96)
ALBUMIN SERPL BCP-MCNC: 4 G/DL (ref 3.5–5.2)
ALP SERPL-CCNC: 67 U/L (ref 55–135)
ALT SERPL W/O P-5'-P-CCNC: 28 U/L (ref 10–44)
ANION GAP SERPL CALC-SCNC: 11 MMOL/L (ref 8–16)
AST SERPL-CCNC: 24 U/L (ref 10–40)
BASOPHILS # BLD AUTO: 0.02 K/UL (ref 0–0.2)
BASOPHILS NFR BLD: 0.4 % (ref 0–1.9)
BILIRUB SERPL-MCNC: 0.7 MG/DL (ref 0.1–1)
BILIRUB UR QL STRIP: NEGATIVE
BUN SERPL-MCNC: 13 MG/DL (ref 6–20)
CALCIUM SERPL-MCNC: 8.5 MG/DL (ref 8.7–10.5)
CHLORIDE SERPL-SCNC: 104 MMOL/L (ref 95–110)
CHOLEST SERPL-MCNC: 138 MG/DL (ref 120–199)
CHOLEST/HDLC SERPL: 3.7 {RATIO} (ref 2–5)
CLARITY UR REFRACT.AUTO: CLEAR
CO2 SERPL-SCNC: 24 MMOL/L (ref 23–29)
COLOR UR AUTO: ABNORMAL
CREAT SERPL-MCNC: 1.1 MG/DL (ref 0.5–1.4)
DIFFERENTIAL METHOD: ABNORMAL
EOSINOPHIL # BLD AUTO: 0 K/UL (ref 0–0.5)
EOSINOPHIL NFR BLD: 0.8 % (ref 0–8)
ERYTHROCYTE [DISTWIDTH] IN BLOOD BY AUTOMATED COUNT: 14 % (ref 11.5–14.5)
EST. GFR  (AFRICAN AMERICAN): >60 ML/MIN/1.73 M^2
EST. GFR  (NON AFRICAN AMERICAN): >60 ML/MIN/1.73 M^2
FEF 25 75 LLN: 1.62
FEF 25 75 PRE REF: 114.5 %
FEF 25 75 REF: 3.19
FERRITIN SERPL-MCNC: 198 NG/ML (ref 20–300)
FEV05 LLN: 1.75
FEV05 REF: 2.89
FEV1 FVC LLN: 66
FEV1 FVC PRE REF: 102.7 %
FEV1 FVC REF: 78
FEV1 LLN: 2.84
FEV1 PRE REF: 93 %
FEV1 REF: 3.71
FVC LLN: 3.67
FVC PRE REF: 90.3 %
FVC REF: 4.77
GGT SERPL-CCNC: 41 U/L (ref 8–55)
GLUCOSE SERPL-MCNC: 99 MG/DL (ref 70–110)
GLUCOSE UR QL STRIP: NEGATIVE
HCT VFR BLD AUTO: 53.8 % (ref 40–54)
HDLC SERPL-MCNC: 37 MG/DL (ref 40–75)
HDLC SERPL: 26.8 % (ref 20–50)
HGB BLD-MCNC: 17.7 G/DL (ref 14–18)
HGB UR QL STRIP: NEGATIVE
IGE SERPL-ACNC: 76 IU/ML (ref 0–100)
IMM GRANULOCYTES # BLD AUTO: 0.02 K/UL (ref 0–0.04)
IMM GRANULOCYTES NFR BLD AUTO: 0.4 % (ref 0–0.5)
IRON SERPL-MCNC: 39 UG/DL (ref 45–160)
IRON SERPL-MCNC: 39 UG/DL (ref 45–160)
KETONES UR QL STRIP: ABNORMAL
LDH SERPL L TO P-CCNC: 209 U/L (ref 110–260)
LDLC SERPL CALC-MCNC: 79 MG/DL (ref 63–159)
LEUKOCYTE ESTERASE UR QL STRIP: NEGATIVE
LYMPHOCYTES # BLD AUTO: 1.1 K/UL (ref 1–4.8)
LYMPHOCYTES NFR BLD: 22.5 % (ref 18–48)
MCH RBC QN AUTO: 27.8 PG (ref 27–31)
MCHC RBC AUTO-ENTMCNC: 32.9 G/DL (ref 32–36)
MCV RBC AUTO: 85 FL (ref 82–98)
MICROSCOPIC COMMENT: NORMAL
MONOCYTES # BLD AUTO: 0.5 K/UL (ref 0.3–1)
MONOCYTES NFR BLD: 9 % (ref 4–15)
NEUTROPHILS # BLD AUTO: 3.4 K/UL (ref 1.8–7.7)
NEUTROPHILS NFR BLD: 66.9 % (ref 38–73)
NITRITE UR QL STRIP: NEGATIVE
NONHDLC SERPL-MCNC: 101 MG/DL
NRBC BLD-RTO: 0 /100 WBC
PEF LLN: 7.19
PEF PRE REF: 127.7 %
PEF REF: 9.51
PH UR STRIP: 6 [PH] (ref 5–8)
PHOSPHATE SERPL-MCNC: 1.7 MG/DL (ref 2.7–4.5)
PHYSICIAN COMMENT: ABNORMAL
PLATELET # BLD AUTO: 171 K/UL (ref 150–450)
PMV BLD AUTO: 10.4 FL (ref 9.2–12.9)
POTASSIUM SERPL-SCNC: 3.9 MMOL/L (ref 3.5–5.1)
PRE FEF 25 75: 3.65 L/S (ref 1.62–4.76)
PRE FET 100: 6.01 SEC
PRE FEV05 REF: 103.3 %
PRE FEV1 FVC: 80.06 % (ref 66.38–89.59)
PRE FEV1: 3.45 L (ref 2.84–4.59)
PRE FEV5: 2.98 L (ref 1.75–4.02)
PRE FVC: 4.31 L (ref 3.67–5.88)
PRE PEF: 12.15 L/S (ref 7.19–11.84)
PROT SERPL-MCNC: 7.9 G/DL (ref 6–8.4)
PROT SERPL-MCNC: 7.9 G/DL (ref 6–8.4)
PROT UR QL STRIP: NEGATIVE
RBC # BLD AUTO: 6.36 M/UL (ref 4.6–6.2)
RBC #/AREA URNS AUTO: 0 /HPF (ref 0–4)
SATURATED IRON: 10 % (ref 20–50)
SODIUM SERPL-SCNC: 139 MMOL/L (ref 136–145)
SP GR UR STRIP: 1 (ref 1–1.03)
TOTAL IRON BINDING CAPACITY: 401 UG/DL (ref 250–450)
TRANSFERRIN SERPL-MCNC: 271 MG/DL (ref 200–375)
TRIGL SERPL-MCNC: 110 MG/DL (ref 30–150)
URATE SERPL-MCNC: 7.2 MG/DL (ref 3.4–7)
URN SPEC COLLECT METH UR: ABNORMAL
WBC # BLD AUTO: 5.02 K/UL (ref 3.9–12.7)
WBC #/AREA URNS AUTO: 0 /HPF (ref 0–5)

## 2021-04-01 PROCEDURE — 81001 URINALYSIS AUTO W/SCOPE: CPT | Performed by: INTERNAL MEDICINE

## 2021-04-01 PROCEDURE — 93010 EKG 12-LEAD: ICD-10-PCS | Mod: S$GLB,,, | Performed by: INTERNAL MEDICINE

## 2021-04-01 PROCEDURE — 99396 PR PREVENTIVE VISIT,EST,40-64: ICD-10-PCS | Mod: S$GLB,,, | Performed by: INTERNAL MEDICINE

## 2021-04-01 PROCEDURE — 80061 LIPID PANEL: CPT | Performed by: INTERNAL MEDICINE

## 2021-04-01 PROCEDURE — 92552 PR PURE TONE AUDIOMETRY, AIR: ICD-10-PCS | Mod: S$GLB,,, | Performed by: AUDIOLOGIST

## 2021-04-01 PROCEDURE — 85025 COMPLETE CBC W/AUTO DIFF WBC: CPT | Performed by: INTERNAL MEDICINE

## 2021-04-01 PROCEDURE — 83615 LACTATE (LD) (LDH) ENZYME: CPT | Performed by: INTERNAL MEDICINE

## 2021-04-01 PROCEDURE — 80053 COMPREHEN METABOLIC PANEL: CPT | Performed by: INTERNAL MEDICINE

## 2021-04-01 PROCEDURE — 84100 ASSAY OF PHOSPHORUS: CPT | Performed by: INTERNAL MEDICINE

## 2021-04-01 PROCEDURE — 84550 ASSAY OF BLOOD/URIC ACID: CPT | Performed by: INTERNAL MEDICINE

## 2021-04-01 PROCEDURE — 99999 PR PBB SHADOW E&M-EST. PATIENT-LVL I: CPT | Mod: PBBFAC,,, | Performed by: AUDIOLOGIST

## 2021-04-01 PROCEDURE — 82977 ASSAY OF GGT: CPT | Performed by: INTERNAL MEDICINE

## 2021-04-01 PROCEDURE — 99999 PR PBB SHADOW E&M-EST. PATIENT-LVL I: ICD-10-PCS | Mod: PBBFAC,,, | Performed by: AUDIOLOGIST

## 2021-04-01 PROCEDURE — 92552 PURE TONE AUDIOMETRY AIR: CPT | Mod: S$GLB,,, | Performed by: AUDIOLOGIST

## 2021-04-01 PROCEDURE — 99396 PREV VISIT EST AGE 40-64: CPT | Mod: S$GLB,,, | Performed by: INTERNAL MEDICINE

## 2021-04-01 PROCEDURE — 93010 ELECTROCARDIOGRAM REPORT: CPT | Mod: S$GLB,,, | Performed by: INTERNAL MEDICINE

## 2021-04-01 PROCEDURE — 93005 EKG 12-LEAD: ICD-10-PCS | Mod: S$GLB,,, | Performed by: INTERNAL MEDICINE

## 2021-04-01 PROCEDURE — 82306 VITAMIN D 25 HYDROXY: CPT | Performed by: INTERNAL MEDICINE

## 2021-04-01 PROCEDURE — 71046 X-RAY EXAM CHEST 2 VIEWS: CPT | Mod: 26,,, | Performed by: RADIOLOGY

## 2021-04-01 PROCEDURE — 36415 COLL VENOUS BLD VENIPUNCTURE: CPT | Performed by: INTERNAL MEDICINE

## 2021-04-01 PROCEDURE — 71046 X-RAY EXAM CHEST 2 VIEWS: CPT | Mod: TC,FY

## 2021-04-01 PROCEDURE — 93005 ELECTROCARDIOGRAM TRACING: CPT | Mod: S$GLB,,, | Performed by: INTERNAL MEDICINE

## 2021-04-01 PROCEDURE — 94010 BREATHING CAPACITY TEST: ICD-10-PCS | Mod: S$GLB,,, | Performed by: INTERNAL MEDICINE

## 2021-04-01 PROCEDURE — 99999 PR PBB SHADOW E&M-EST. PATIENT-LVL III: ICD-10-PCS | Mod: PBBFAC,,, | Performed by: INTERNAL MEDICINE

## 2021-04-01 PROCEDURE — 82785 ASSAY OF IGE: CPT | Performed by: INTERNAL MEDICINE

## 2021-04-01 PROCEDURE — 94010 BREATHING CAPACITY TEST: CPT | Mod: S$GLB,,, | Performed by: INTERNAL MEDICINE

## 2021-04-01 PROCEDURE — 99999 PR PBB SHADOW E&M-EST. PATIENT-LVL III: CPT | Mod: PBBFAC,,, | Performed by: INTERNAL MEDICINE

## 2021-04-01 PROCEDURE — 71046 XR CHEST PA AND LATERAL: ICD-10-PCS | Mod: 26,,, | Performed by: RADIOLOGY

## 2021-04-01 PROCEDURE — 83540 ASSAY OF IRON: CPT | Performed by: INTERNAL MEDICINE

## 2021-04-01 PROCEDURE — 82728 ASSAY OF FERRITIN: CPT | Performed by: INTERNAL MEDICINE

## 2021-07-20 ENCOUNTER — LAB VISIT (OUTPATIENT)
Dept: LAB | Facility: HOSPITAL | Age: 56
End: 2021-07-20
Attending: UROLOGY
Payer: COMMERCIAL

## 2021-07-20 DIAGNOSIS — Z12.5 SCREENING FOR PROSTATE CANCER: ICD-10-CM

## 2021-07-20 DIAGNOSIS — R79.89 LOW TESTOSTERONE LEVEL IN MALE: ICD-10-CM

## 2021-07-20 LAB
COMPLEXED PSA SERPL-MCNC: 1 NG/ML (ref 0–4)
HCT VFR BLD AUTO: 48.7 % (ref 40–54)
HGB BLD-MCNC: 15.6 G/DL (ref 14–18)

## 2021-07-20 PROCEDURE — 85018 HEMOGLOBIN: CPT | Performed by: UROLOGY

## 2021-07-20 PROCEDURE — 36415 COLL VENOUS BLD VENIPUNCTURE: CPT | Mod: PO | Performed by: UROLOGY

## 2021-07-20 PROCEDURE — 85014 HEMATOCRIT: CPT | Performed by: UROLOGY

## 2021-07-20 PROCEDURE — 84403 ASSAY OF TOTAL TESTOSTERONE: CPT | Performed by: UROLOGY

## 2021-07-20 PROCEDURE — 84153 ASSAY OF PSA TOTAL: CPT | Performed by: UROLOGY

## 2021-07-21 LAB — TESTOST SERPL-MCNC: 1180 NG/DL (ref 304–1227)

## 2021-09-24 ENCOUNTER — OFFICE VISIT (OUTPATIENT)
Dept: UROLOGY | Facility: CLINIC | Age: 56
End: 2021-09-24
Payer: COMMERCIAL

## 2021-09-24 VITALS — HEIGHT: 70 IN | WEIGHT: 211 LBS | BODY MASS INDEX: 30.21 KG/M2

## 2021-09-24 DIAGNOSIS — Z80.42 FAMILY HISTORY OF PROSTATE CANCER: ICD-10-CM

## 2021-09-24 DIAGNOSIS — N52.9 IMPOTENCE: ICD-10-CM

## 2021-09-24 DIAGNOSIS — R79.89 LOW TESTOSTERONE LEVEL IN MALE: ICD-10-CM

## 2021-09-24 DIAGNOSIS — N40.0 BPH WITHOUT URINARY OBSTRUCTION: Primary | ICD-10-CM

## 2021-09-24 PROCEDURE — 4010F ACE/ARB THERAPY RXD/TAKEN: CPT | Mod: CPTII,S$GLB,, | Performed by: UROLOGY

## 2021-09-24 PROCEDURE — 1160F RVW MEDS BY RX/DR IN RCRD: CPT | Mod: CPTII,S$GLB,, | Performed by: UROLOGY

## 2021-09-24 PROCEDURE — 3008F PR BODY MASS INDEX (BMI) DOCUMENTED: ICD-10-PCS | Mod: CPTII,S$GLB,, | Performed by: UROLOGY

## 2021-09-24 PROCEDURE — 99214 PR OFFICE/OUTPT VISIT, EST, LEVL IV, 30-39 MIN: ICD-10-PCS | Mod: S$GLB,,, | Performed by: UROLOGY

## 2021-09-24 PROCEDURE — 3008F BODY MASS INDEX DOCD: CPT | Mod: CPTII,S$GLB,, | Performed by: UROLOGY

## 2021-09-24 PROCEDURE — 99999 PR PBB SHADOW E&M-EST. PATIENT-LVL III: ICD-10-PCS | Mod: PBBFAC,,, | Performed by: UROLOGY

## 2021-09-24 PROCEDURE — 4010F PR ACE/ARB THEARPY RXD/TAKEN: ICD-10-PCS | Mod: CPTII,S$GLB,, | Performed by: UROLOGY

## 2021-09-24 PROCEDURE — 99999 PR PBB SHADOW E&M-EST. PATIENT-LVL III: CPT | Mod: PBBFAC,,, | Performed by: UROLOGY

## 2021-09-24 PROCEDURE — 99214 OFFICE O/P EST MOD 30 MIN: CPT | Mod: S$GLB,,, | Performed by: UROLOGY

## 2021-09-24 PROCEDURE — 1159F MED LIST DOCD IN RCRD: CPT | Mod: CPTII,S$GLB,, | Performed by: UROLOGY

## 2021-09-24 PROCEDURE — 1159F PR MEDICATION LIST DOCUMENTED IN MEDICAL RECORD: ICD-10-PCS | Mod: CPTII,S$GLB,, | Performed by: UROLOGY

## 2021-09-24 PROCEDURE — 1160F PR REVIEW ALL MEDS BY PRESCRIBER/CLIN PHARMACIST DOCUMENTED: ICD-10-PCS | Mod: CPTII,S$GLB,, | Performed by: UROLOGY

## 2022-03-02 ENCOUNTER — LAB VISIT (OUTPATIENT)
Dept: LAB | Facility: HOSPITAL | Age: 57
End: 2022-03-02
Attending: UROLOGY
Payer: COMMERCIAL

## 2022-03-02 DIAGNOSIS — R79.89 LOW TESTOSTERONE LEVEL IN MALE: ICD-10-CM

## 2022-03-02 LAB
COMPLEXED PSA SERPL-MCNC: 1.1 NG/ML (ref 0–4)
HCT VFR BLD AUTO: 49.6 % (ref 40–54)
HGB BLD-MCNC: 16.3 G/DL (ref 14–18)
TESTOST SERPL-MCNC: 981 NG/DL (ref 304–1227)

## 2022-03-02 PROCEDURE — 85014 HEMATOCRIT: CPT | Performed by: UROLOGY

## 2022-03-02 PROCEDURE — 36415 COLL VENOUS BLD VENIPUNCTURE: CPT | Mod: PO | Performed by: UROLOGY

## 2022-03-02 PROCEDURE — 84153 ASSAY OF PSA TOTAL: CPT | Performed by: UROLOGY

## 2022-03-02 PROCEDURE — 85018 HEMOGLOBIN: CPT | Performed by: UROLOGY

## 2022-03-02 PROCEDURE — 84403 ASSAY OF TOTAL TESTOSTERONE: CPT | Performed by: UROLOGY

## 2022-07-13 DIAGNOSIS — R79.89 LOW TESTOSTERONE LEVEL IN MALE: ICD-10-CM

## 2022-07-13 RX ORDER — TESTOSTERONE CYPIONATE 200 MG/ML
150 INJECTION, SOLUTION INTRAMUSCULAR
Qty: 10 ML | Refills: 2 | Status: SHIPPED | OUTPATIENT
Start: 2022-07-13 | End: 2023-06-07

## 2022-09-13 ENCOUNTER — TELEPHONE (OUTPATIENT)
Dept: UROLOGY | Facility: CLINIC | Age: 57
End: 2022-09-13
Payer: COMMERCIAL

## 2022-09-13 ENCOUNTER — PATIENT MESSAGE (OUTPATIENT)
Dept: UROLOGY | Facility: CLINIC | Age: 57
End: 2022-09-13
Payer: COMMERCIAL

## 2022-09-13 DIAGNOSIS — Z80.42 FAMILY HISTORY OF PROSTATE CANCER: ICD-10-CM

## 2022-09-13 DIAGNOSIS — R79.89 LOW TESTOSTERONE LEVEL IN MALE: Primary | ICD-10-CM

## 2022-09-13 NOTE — TELEPHONE ENCOUNTER
----- Message from Maggy Judge sent at 9/13/2022  9:35 AM CDT -----  Contact: Patient  Type:  Patient Call          Who Called:Patient         Does the patient know what this is regarding?:requesting a call back Pt said he need lab orders and he's called and sent several messages ; PT need these test done no later than 9/14 to check levels since his injection on 9/10; PT need these orders before his 9/26 appt ;please advise           Would the patient rather a call back or a response via [facility]? Both          Best Call Back Number:[phone]             Additional Information:levels need to be checked during peak and tomorrow is the third day and labs are due  ;Pt is going out of town soon so please advise

## 2022-09-13 NOTE — TELEPHONE ENCOUNTER
----- Message from Jordana Vergara sent at 9/12/2022 11:38 AM CDT -----  Contact: pt [phone]  2nd Request    Calling to check on lab orders.    Please call and advise.    Thank You

## 2022-09-14 ENCOUNTER — LAB VISIT (OUTPATIENT)
Dept: LAB | Facility: HOSPITAL | Age: 57
End: 2022-09-14
Attending: UROLOGY
Payer: COMMERCIAL

## 2022-09-14 ENCOUNTER — TELEPHONE (OUTPATIENT)
Dept: UROLOGY | Facility: CLINIC | Age: 57
End: 2022-09-14
Payer: COMMERCIAL

## 2022-09-14 DIAGNOSIS — Z80.42 FAMILY HISTORY OF PROSTATE CANCER: ICD-10-CM

## 2022-09-14 DIAGNOSIS — R79.89 LOW TESTOSTERONE LEVEL IN MALE: ICD-10-CM

## 2022-09-14 LAB
COMPLEXED PSA SERPL-MCNC: 0.85 NG/ML (ref 0–4)
HCT VFR BLD AUTO: 49.1 % (ref 40–54)
HGB BLD-MCNC: 16.8 G/DL (ref 14–18)
TESTOST SERPL-MCNC: 1116 NG/DL (ref 304–1227)

## 2022-09-14 PROCEDURE — 84153 ASSAY OF PSA TOTAL: CPT | Performed by: UROLOGY

## 2022-09-14 PROCEDURE — 85018 HEMOGLOBIN: CPT | Performed by: UROLOGY

## 2022-09-14 PROCEDURE — 85014 HEMATOCRIT: CPT | Performed by: UROLOGY

## 2022-09-14 PROCEDURE — 36415 COLL VENOUS BLD VENIPUNCTURE: CPT | Mod: PO | Performed by: UROLOGY

## 2022-09-14 PROCEDURE — 84403 ASSAY OF TOTAL TESTOSTERONE: CPT | Performed by: UROLOGY

## 2022-09-26 ENCOUNTER — OFFICE VISIT (OUTPATIENT)
Dept: UROLOGY | Facility: CLINIC | Age: 57
End: 2022-09-26
Payer: COMMERCIAL

## 2022-09-26 VITALS — WEIGHT: 225.31 LBS | HEIGHT: 70 IN | BODY MASS INDEX: 32.26 KG/M2

## 2022-09-26 DIAGNOSIS — R79.89 LOW TESTOSTERONE LEVEL IN MALE: ICD-10-CM

## 2022-09-26 DIAGNOSIS — Z80.42 FAMILY HISTORY OF PROSTATE CANCER: ICD-10-CM

## 2022-09-26 DIAGNOSIS — N40.0 BPH WITHOUT URINARY OBSTRUCTION: Primary | ICD-10-CM

## 2022-09-26 PROCEDURE — 1160F RVW MEDS BY RX/DR IN RCRD: CPT | Mod: CPTII,S$GLB,, | Performed by: UROLOGY

## 2022-09-26 PROCEDURE — 3044F PR MOST RECENT HEMOGLOBIN A1C LEVEL <7.0%: ICD-10-PCS | Mod: CPTII,S$GLB,, | Performed by: UROLOGY

## 2022-09-26 PROCEDURE — 4010F ACE/ARB THERAPY RXD/TAKEN: CPT | Mod: CPTII,S$GLB,, | Performed by: UROLOGY

## 2022-09-26 PROCEDURE — 99999 PR PBB SHADOW E&M-EST. PATIENT-LVL III: ICD-10-PCS | Mod: PBBFAC,,, | Performed by: UROLOGY

## 2022-09-26 PROCEDURE — 3044F HG A1C LEVEL LT 7.0%: CPT | Mod: CPTII,S$GLB,, | Performed by: UROLOGY

## 2022-09-26 PROCEDURE — 3008F BODY MASS INDEX DOCD: CPT | Mod: CPTII,S$GLB,, | Performed by: UROLOGY

## 2022-09-26 PROCEDURE — 1159F MED LIST DOCD IN RCRD: CPT | Mod: CPTII,S$GLB,, | Performed by: UROLOGY

## 2022-09-26 PROCEDURE — 99214 OFFICE O/P EST MOD 30 MIN: CPT | Mod: S$GLB,,, | Performed by: UROLOGY

## 2022-09-26 PROCEDURE — 99999 PR PBB SHADOW E&M-EST. PATIENT-LVL III: CPT | Mod: PBBFAC,,, | Performed by: UROLOGY

## 2022-09-26 PROCEDURE — 4010F PR ACE/ARB THEARPY RXD/TAKEN: ICD-10-PCS | Mod: CPTII,S$GLB,, | Performed by: UROLOGY

## 2022-09-26 PROCEDURE — 1159F PR MEDICATION LIST DOCUMENTED IN MEDICAL RECORD: ICD-10-PCS | Mod: CPTII,S$GLB,, | Performed by: UROLOGY

## 2022-09-26 PROCEDURE — 99214 PR OFFICE/OUTPT VISIT, EST, LEVL IV, 30-39 MIN: ICD-10-PCS | Mod: S$GLB,,, | Performed by: UROLOGY

## 2022-09-26 PROCEDURE — 3008F PR BODY MASS INDEX (BMI) DOCUMENTED: ICD-10-PCS | Mod: CPTII,S$GLB,, | Performed by: UROLOGY

## 2022-09-26 PROCEDURE — 1160F PR REVIEW ALL MEDS BY PRESCRIBER/CLIN PHARMACIST DOCUMENTED: ICD-10-PCS | Mod: CPTII,S$GLB,, | Performed by: UROLOGY

## 2022-09-26 RX ORDER — SILDENAFIL 100 MG/1
100 TABLET, FILM COATED ORAL DAILY PRN
Qty: 10 TABLET | Refills: 11 | Status: SHIPPED | OUTPATIENT
Start: 2022-09-26 | End: 2023-09-26

## 2022-09-26 NOTE — PROGRESS NOTES
Subjective:       Patient ID: Ron Weiss is a 57 y.o. male.    Chief Complaint: Annual Exam    HPI    57-year-old with ED and low testosterone.  He is on IM testosterone replacement.  Current dose is 150 mg every 2 week.  Peak testosterone is 1116.  PSA is 0.85 and stable.  He has no voiding complaints.  He denies hematuria and dysuria.  H&H are normal.  He has a strong family history of prostate cancer.  He wants to monitor his PSA every 6 months.  He is overall doing well.    Component PSA Diagnostic   Latest Ref Rng & Units 0.00 - 4.00 ng/mL   9/14/2022 0.85   3/2/2022 1.1   7/20/2021 1.0   2/4/2021 1.1   8/5/2020 0.83     Review of Systems   Constitutional:  Negative for fever.   Genitourinary:  Negative for dysuria and hematuria.     Objective:      Physical Exam  Vitals reviewed.   Constitutional:       Appearance: He is well-developed.   HENT:      Head: Normocephalic and atraumatic.   Eyes:      Conjunctiva/sclera: Conjunctivae normal.   Cardiovascular:      Rate and Rhythm: Normal rate.   Pulmonary:      Effort: Pulmonary effort is normal.   Genitourinary:     Prostate: Enlarged (40g, s/s/a). Not tender.      Rectum: No mass. Normal anal tone.   Musculoskeletal:         General: Normal range of motion.   Skin:     General: Skin is warm and dry.      Findings: No rash.   Neurological:      Mental Status: He is alert and oriented to person, place, and time.       Assessment:       1. BPH without urinary obstruction    2. Low testosterone level in male    3. Family history of prostate cancer        Plan:       BPH without urinary obstruction  -     Prostate Specific Antigen, Diagnostic; Future; Expected date: 03/26/2023    Low testosterone level in male  -     Hematocrit; Future; Expected date: 03/26/2023  -     Hemoglobin; Future; Expected date: 03/26/2023  -     Testosterone; Future; Expected date: 03/26/2023    Family history of prostate cancer    Other orders  -     sildenafiL (VIAGRA) 100 MG tablet;  Take 1 tablet (100 mg total) by mouth daily as needed.  Dispense: 10 tablet; Refill: 11      Continue testosterone at current dose.  Annual follow-up.  Will repeat labs in 6 months

## 2023-03-29 ENCOUNTER — LAB VISIT (OUTPATIENT)
Dept: LAB | Facility: HOSPITAL | Age: 58
End: 2023-03-29
Attending: UROLOGY
Payer: COMMERCIAL

## 2023-03-29 DIAGNOSIS — K21.9 ESOPHAGEAL REFLUX: ICD-10-CM

## 2023-03-29 DIAGNOSIS — N40.0 BPH WITHOUT URINARY OBSTRUCTION: ICD-10-CM

## 2023-03-29 DIAGNOSIS — I10 ESSENTIAL HYPERTENSION, MALIGNANT: Primary | ICD-10-CM

## 2023-03-29 DIAGNOSIS — R79.89 LOW TESTOSTERONE LEVEL IN MALE: ICD-10-CM

## 2023-03-29 DIAGNOSIS — Z00.00 ROUTINE GENERAL MEDICAL EXAMINATION AT A HEALTH CARE FACILITY: ICD-10-CM

## 2023-03-29 LAB
ALBUMIN SERPL BCP-MCNC: 4.2 G/DL (ref 3.5–5.2)
ALP SERPL-CCNC: 74 U/L (ref 55–135)
ALT SERPL W/O P-5'-P-CCNC: 39 U/L (ref 10–44)
ANION GAP SERPL CALC-SCNC: 8 MMOL/L (ref 8–16)
AST SERPL-CCNC: 29 U/L (ref 10–40)
BILIRUB SERPL-MCNC: 0.9 MG/DL (ref 0.1–1)
BUN SERPL-MCNC: 16 MG/DL (ref 6–20)
CALCIUM SERPL-MCNC: 9.1 MG/DL (ref 8.7–10.5)
CHLORIDE SERPL-SCNC: 105 MMOL/L (ref 95–110)
CHOLEST SERPL-MCNC: 159 MG/DL (ref 120–199)
CHOLEST/HDLC SERPL: 3.9 {RATIO} (ref 2–5)
CO2 SERPL-SCNC: 25 MMOL/L (ref 23–29)
COMPLEXED PSA SERPL-MCNC: 1 NG/ML (ref 0–4)
CREAT SERPL-MCNC: 1.2 MG/DL (ref 0.5–1.4)
EST. GFR  (NO RACE VARIABLE): >60 ML/MIN/1.73 M^2
ESTIMATED AVG GLUCOSE: 114 MG/DL (ref 68–131)
GLUCOSE SERPL-MCNC: 107 MG/DL (ref 70–110)
HBA1C MFR BLD: 5.6 % (ref 4–5.6)
HCT VFR BLD AUTO: 49.5 % (ref 40–54)
HDLC SERPL-MCNC: 41 MG/DL (ref 40–75)
HDLC SERPL: 25.8 % (ref 20–50)
HGB BLD-MCNC: 15.8 G/DL (ref 14–18)
LDLC SERPL CALC-MCNC: 91.4 MG/DL (ref 63–159)
NONHDLC SERPL-MCNC: 118 MG/DL
POTASSIUM SERPL-SCNC: 3.8 MMOL/L (ref 3.5–5.1)
PROT SERPL-MCNC: 7.8 G/DL (ref 6–8.4)
SODIUM SERPL-SCNC: 138 MMOL/L (ref 136–145)
TESTOST SERPL-MCNC: 819 NG/DL (ref 304–1227)
TRIGL SERPL-MCNC: 133 MG/DL (ref 30–150)

## 2023-03-29 PROCEDURE — 84403 ASSAY OF TOTAL TESTOSTERONE: CPT | Performed by: UROLOGY

## 2023-03-29 PROCEDURE — 36415 COLL VENOUS BLD VENIPUNCTURE: CPT | Mod: PO | Performed by: UROLOGY

## 2023-03-29 PROCEDURE — 85014 HEMATOCRIT: CPT | Performed by: UROLOGY

## 2023-03-29 PROCEDURE — 80061 LIPID PANEL: CPT | Performed by: INTERNAL MEDICINE

## 2023-03-29 PROCEDURE — 85018 HEMOGLOBIN: CPT | Performed by: UROLOGY

## 2023-03-29 PROCEDURE — 84153 ASSAY OF PSA TOTAL: CPT | Performed by: UROLOGY

## 2023-03-29 PROCEDURE — 80053 COMPREHEN METABOLIC PANEL: CPT | Performed by: INTERNAL MEDICINE

## 2023-03-29 PROCEDURE — 83036 HEMOGLOBIN GLYCOSYLATED A1C: CPT | Performed by: INTERNAL MEDICINE

## 2023-06-07 DIAGNOSIS — R79.89 LOW TESTOSTERONE LEVEL IN MALE: ICD-10-CM

## 2023-06-07 RX ORDER — TESTOSTERONE CYPIONATE 200 MG/ML
INJECTION, SOLUTION INTRAMUSCULAR
Qty: 10 ML | Refills: 2 | Status: SHIPPED | OUTPATIENT
Start: 2023-06-07

## 2023-08-11 ENCOUNTER — TELEPHONE (OUTPATIENT)
Dept: UROLOGY | Facility: CLINIC | Age: 58
End: 2023-08-11
Payer: COMMERCIAL

## 2023-08-11 DIAGNOSIS — R79.89 LOW TESTOSTERONE LEVEL IN MALE: Primary | ICD-10-CM

## 2023-08-16 ENCOUNTER — LAB VISIT (OUTPATIENT)
Dept: LAB | Facility: HOSPITAL | Age: 58
End: 2023-08-16
Attending: UROLOGY
Payer: COMMERCIAL

## 2023-08-16 DIAGNOSIS — R79.89 LOW TESTOSTERONE LEVEL IN MALE: ICD-10-CM

## 2023-08-16 LAB
COMPLEXED PSA SERPL-MCNC: 1.3 NG/ML (ref 0–4)
ESTIMATED AVG GLUCOSE: 108 MG/DL (ref 68–131)
HBA1C MFR BLD: 5.4 % (ref 4–5.6)
HCT VFR BLD AUTO: 52.2 % (ref 40–54)
TESTOST SERPL-MCNC: 723 NG/DL (ref 304–1227)

## 2023-08-16 PROCEDURE — 83036 HEMOGLOBIN GLYCOSYLATED A1C: CPT | Performed by: UROLOGY

## 2023-08-16 PROCEDURE — 84153 ASSAY OF PSA TOTAL: CPT | Performed by: UROLOGY

## 2023-08-16 PROCEDURE — 84403 ASSAY OF TOTAL TESTOSTERONE: CPT | Performed by: UROLOGY

## 2023-08-16 PROCEDURE — 85014 HEMATOCRIT: CPT | Performed by: UROLOGY

## 2023-08-16 PROCEDURE — 36415 COLL VENOUS BLD VENIPUNCTURE: CPT | Mod: PO | Performed by: UROLOGY

## 2023-08-28 ENCOUNTER — OFFICE VISIT (OUTPATIENT)
Dept: UROLOGY | Facility: CLINIC | Age: 58
End: 2023-08-28
Payer: COMMERCIAL

## 2023-08-28 VITALS — HEIGHT: 70 IN | BODY MASS INDEX: 32.86 KG/M2

## 2023-08-28 DIAGNOSIS — Z80.42 FAMILY HISTORY OF PROSTATE CANCER: ICD-10-CM

## 2023-08-28 DIAGNOSIS — R79.89 LOW TESTOSTERONE LEVEL IN MALE: Primary | ICD-10-CM

## 2023-08-28 DIAGNOSIS — Z12.5 SCREENING FOR PROSTATE CANCER: ICD-10-CM

## 2023-08-28 DIAGNOSIS — N40.0 BPH WITHOUT URINARY OBSTRUCTION: ICD-10-CM

## 2023-08-28 DIAGNOSIS — N52.01 ERECTILE DYSFUNCTION DUE TO ARTERIAL INSUFFICIENCY: ICD-10-CM

## 2023-08-28 LAB
BILIRUBIN, UA POC OHS: NEGATIVE
BLOOD, UA POC OHS: ABNORMAL
CLARITY, UA POC OHS: CLEAR
COLOR, UA POC OHS: YELLOW
GLUCOSE, UA POC OHS: NEGATIVE
KETONES, UA POC OHS: NEGATIVE
LEUKOCYTES, UA POC OHS: NEGATIVE
NITRITE, UA POC OHS: NEGATIVE
PH, UA POC OHS: 5.5
PROTEIN, UA POC OHS: NEGATIVE
SPECIFIC GRAVITY, UA POC OHS: >=1.03
UROBILINOGEN, UA POC OHS: 1

## 2023-08-28 PROCEDURE — 81003 POCT URINALYSIS(INSTRUMENT): ICD-10-PCS | Mod: QW,S$GLB,, | Performed by: UROLOGY

## 2023-08-28 PROCEDURE — 1159F PR MEDICATION LIST DOCUMENTED IN MEDICAL RECORD: ICD-10-PCS | Mod: CPTII,S$GLB,, | Performed by: UROLOGY

## 2023-08-28 PROCEDURE — 99999 PR PBB SHADOW E&M-EST. PATIENT-LVL III: CPT | Mod: PBBFAC,,, | Performed by: UROLOGY

## 2023-08-28 PROCEDURE — 3008F BODY MASS INDEX DOCD: CPT | Mod: CPTII,S$GLB,, | Performed by: UROLOGY

## 2023-08-28 PROCEDURE — 81003 URINALYSIS AUTO W/O SCOPE: CPT | Mod: QW,S$GLB,, | Performed by: UROLOGY

## 2023-08-28 PROCEDURE — 99214 PR OFFICE/OUTPT VISIT, EST, LEVL IV, 30-39 MIN: ICD-10-PCS | Mod: S$GLB,,, | Performed by: UROLOGY

## 2023-08-28 PROCEDURE — 1159F MED LIST DOCD IN RCRD: CPT | Mod: CPTII,S$GLB,, | Performed by: UROLOGY

## 2023-08-28 PROCEDURE — 99214 OFFICE O/P EST MOD 30 MIN: CPT | Mod: S$GLB,,, | Performed by: UROLOGY

## 2023-08-28 PROCEDURE — 4010F PR ACE/ARB THEARPY RXD/TAKEN: ICD-10-PCS | Mod: CPTII,S$GLB,, | Performed by: UROLOGY

## 2023-08-28 PROCEDURE — 3044F HG A1C LEVEL LT 7.0%: CPT | Mod: CPTII,S$GLB,, | Performed by: UROLOGY

## 2023-08-28 PROCEDURE — 3008F PR BODY MASS INDEX (BMI) DOCUMENTED: ICD-10-PCS | Mod: CPTII,S$GLB,, | Performed by: UROLOGY

## 2023-08-28 PROCEDURE — 3044F PR MOST RECENT HEMOGLOBIN A1C LEVEL <7.0%: ICD-10-PCS | Mod: CPTII,S$GLB,, | Performed by: UROLOGY

## 2023-08-28 PROCEDURE — 99999 PR PBB SHADOW E&M-EST. PATIENT-LVL III: ICD-10-PCS | Mod: PBBFAC,,, | Performed by: UROLOGY

## 2023-08-28 PROCEDURE — 4010F ACE/ARB THERAPY RXD/TAKEN: CPT | Mod: CPTII,S$GLB,, | Performed by: UROLOGY

## 2023-08-28 RX ORDER — LEVOCETIRIZINE DIHYDROCHLORIDE 5 MG/1
5 TABLET, FILM COATED ORAL NIGHTLY
COMMUNITY
Start: 2023-05-19

## 2023-08-28 RX ORDER — ASCORBIC ACID 500 MG
500 TABLET ORAL
COMMUNITY

## 2023-08-28 NOTE — PROGRESS NOTES
Subjective:       Patient ID: Ron Weiss is a 58 y.o. male.    Chief Complaint: Follow-up    HPI    58-year-old with ED and low testosterone.  He is on IM testosterone replacement.  Current dose is 150 mg every 2 week.  Peak testosterone is 723.  PSA is 1.3 and slightly increased.  He has no voiding complaints.  IPSS is 5 He denies hematuria and dysuria.   He has a strong family history of prostate cancer.  He wants to monitor his PSA every 6 months.  He is overall doing well.      PSA Diagnostic   Latest Ref Rng 0.00 - 4.00 ng/mL   2/4/2021 1.1    7/20/2021 1.0   3/2/2022 1.1    9/14/2022 0.85    3/29/2023 1.0    8/16/2023 1.3      IPSS    Incomplete emptying 0  Frequency  3   Intermittency  0  Urgency  0  Weak stream  1  Straining  0  Sleeping  1  Total:   5      Review of Systems   Constitutional:  Negative for fever.   Genitourinary:  Negative for dysuria and hematuria.       Objective:      Physical Exam  Vitals reviewed.   Constitutional:       Appearance: He is well-developed.   Pulmonary:      Effort: Pulmonary effort is normal.   Genitourinary:     Prostate: Enlarged (40g). Not tender and no nodules present.   Skin:     Findings: No rash.   Neurological:      Mental Status: He is alert and oriented to person, place, and time.         Assessment:       1. Low testosterone level in male    2. Screening for prostate cancer    3. Erectile dysfunction due to arterial insufficiency    4. BPH without urinary obstruction    5. Family history of prostate cancer        Plan:       Low testosterone level in male  -     POCT Urinalysis(Instrument)  -     Hematocrit; Future; Expected date: 02/28/2024  -     Hemoglobin; Future; Expected date: 02/28/2024  -     Testosterone; Future; Expected date: 02/28/2024    Screening for prostate cancer    Erectile dysfunction due to arterial insufficiency    BPH without urinary obstruction  -     Prostate Specific Antigen, Diagnostic; Future; Expected date: 02/28/2024    Family  history of prostate cancer      Continue testosterone at current dose.  Repeat labs in 6 months.  Annual follow-up.

## 2024-02-28 ENCOUNTER — LAB VISIT (OUTPATIENT)
Dept: LAB | Facility: HOSPITAL | Age: 59
End: 2024-02-28
Attending: UROLOGY
Payer: COMMERCIAL

## 2024-02-28 DIAGNOSIS — R79.89 LOW TESTOSTERONE LEVEL IN MALE: ICD-10-CM

## 2024-02-28 DIAGNOSIS — E78.00 PURE HYPERCHOLESTEROLEMIA: Primary | ICD-10-CM

## 2024-02-28 DIAGNOSIS — I10 ESSENTIAL HYPERTENSION, MALIGNANT: ICD-10-CM

## 2024-02-28 DIAGNOSIS — N40.0 BPH WITHOUT URINARY OBSTRUCTION: ICD-10-CM

## 2024-02-28 LAB
ALBUMIN SERPL BCP-MCNC: 4.6 G/DL (ref 3.5–5.2)
ALP SERPL-CCNC: 65 U/L (ref 55–135)
ALT SERPL W/O P-5'-P-CCNC: 42 U/L (ref 10–44)
ANION GAP SERPL CALC-SCNC: 9 MMOL/L (ref 8–16)
AST SERPL-CCNC: 33 U/L (ref 10–40)
BILIRUB SERPL-MCNC: 1.6 MG/DL (ref 0.1–1)
BUN SERPL-MCNC: 20 MG/DL (ref 6–20)
CALCIUM SERPL-MCNC: 9.9 MG/DL (ref 8.7–10.5)
CHLORIDE SERPL-SCNC: 105 MMOL/L (ref 95–110)
CHOLEST SERPL-MCNC: 174 MG/DL (ref 120–199)
CHOLEST/HDLC SERPL: 3.8 {RATIO} (ref 2–5)
CO2 SERPL-SCNC: 24 MMOL/L (ref 23–29)
COMPLEXED PSA SERPL-MCNC: 0.99 NG/ML (ref 0–4)
CREAT SERPL-MCNC: 1.2 MG/DL (ref 0.5–1.4)
EST. GFR  (NO RACE VARIABLE): >60 ML/MIN/1.73 M^2
GLUCOSE SERPL-MCNC: 96 MG/DL (ref 70–110)
HCT VFR BLD AUTO: 52.7 % (ref 40–54)
HDLC SERPL-MCNC: 46 MG/DL (ref 40–75)
HDLC SERPL: 26.4 % (ref 20–50)
HGB BLD-MCNC: 17.3 G/DL (ref 14–18)
LDLC SERPL CALC-MCNC: 108.2 MG/DL (ref 63–159)
NONHDLC SERPL-MCNC: 128 MG/DL
POTASSIUM SERPL-SCNC: 4.4 MMOL/L (ref 3.5–5.1)
PROT SERPL-MCNC: 8.5 G/DL (ref 6–8.4)
SODIUM SERPL-SCNC: 138 MMOL/L (ref 136–145)
TESTOST SERPL-MCNC: 939 NG/DL (ref 304–1227)
TRIGL SERPL-MCNC: 99 MG/DL (ref 30–150)

## 2024-02-28 PROCEDURE — 36415 COLL VENOUS BLD VENIPUNCTURE: CPT | Mod: PO | Performed by: INTERNAL MEDICINE

## 2024-02-28 PROCEDURE — 84153 ASSAY OF PSA TOTAL: CPT | Performed by: UROLOGY

## 2024-02-28 PROCEDURE — 84403 ASSAY OF TOTAL TESTOSTERONE: CPT | Performed by: UROLOGY

## 2024-02-28 PROCEDURE — 85018 HEMOGLOBIN: CPT | Performed by: UROLOGY

## 2024-02-28 PROCEDURE — 85014 HEMATOCRIT: CPT | Performed by: UROLOGY

## 2024-02-28 PROCEDURE — 80053 COMPREHEN METABOLIC PANEL: CPT | Performed by: INTERNAL MEDICINE

## 2024-02-28 PROCEDURE — 80061 LIPID PANEL: CPT | Performed by: INTERNAL MEDICINE

## 2024-04-22 ENCOUNTER — TELEPHONE (OUTPATIENT)
Dept: UROLOGY | Facility: CLINIC | Age: 59
End: 2024-04-22
Payer: COMMERCIAL

## 2024-04-22 NOTE — TELEPHONE ENCOUNTER
----- Message from Rubén Finn sent at 4/22/2024  9:22 AM CDT -----  Type:  RX Refill Request    Who Called:  pt  Refill or New Rx:  refill  RX Name and Strength:  DEPO-TESTOSTERONE 200 mg/mL injection  How is the patient currently taking it? (ex. 1XDay):  as directed  Is this a 30 day or 90 day RX:  90  Preferred Pharmacy with phone number:    "[id number] - [facility] [address] [place] [address] AT [facility] [address]  [address] [place] [address]  Phone: [phone] Fax: [phone]  Local or Mail Order:  local  Ordering Provider:  Fidel Hancock Call Back Number:  [phone]    Additional Information:  pt said he changed his pharmacy--please call and advise--thank you

## 2024-04-23 DIAGNOSIS — R79.89 LOW TESTOSTERONE LEVEL IN MALE: ICD-10-CM

## 2024-04-23 RX ORDER — TESTOSTERONE CYPIONATE 200 MG/ML
150 INJECTION, SOLUTION INTRAMUSCULAR
Qty: 10 ML | Refills: 2 | Status: SHIPPED | OUTPATIENT
Start: 2024-04-23

## 2024-08-15 ENCOUNTER — TELEPHONE (OUTPATIENT)
Dept: UROLOGY | Facility: CLINIC | Age: 59
End: 2024-08-15
Payer: COMMERCIAL

## 2024-08-15 DIAGNOSIS — Z12.5 SCREENING FOR PROSTATE CANCER: ICD-10-CM

## 2024-08-15 DIAGNOSIS — R79.89 LOW TESTOSTERONE LEVEL IN MALE: Primary | ICD-10-CM

## 2024-08-15 NOTE — TELEPHONE ENCOUNTER
----- Message from Zulema Gonzalez sent at 8/15/2024  9:19 AM CDT -----  Type: Needs Medical Advice  Who Called:  pt     Best Call Back Number: [phone]      Additional Information: pt requesting call back in regards to needing his 6 month lab orders to be in system to be scheduled please advise

## 2024-08-22 ENCOUNTER — LAB VISIT (OUTPATIENT)
Dept: LAB | Facility: HOSPITAL | Age: 59
End: 2024-08-22
Attending: INTERNAL MEDICINE
Payer: COMMERCIAL

## 2024-08-22 DIAGNOSIS — R79.89 LOW TESTOSTERONE LEVEL IN MALE: ICD-10-CM

## 2024-08-22 DIAGNOSIS — Z12.5 SCREENING FOR PROSTATE CANCER: ICD-10-CM

## 2024-08-22 DIAGNOSIS — I10 PRIMARY HYPERTENSION: Primary | ICD-10-CM

## 2024-08-22 DIAGNOSIS — R17 ELEVATED BILIRUBIN: ICD-10-CM

## 2024-08-22 LAB
ALBUMIN SERPL BCP-MCNC: 4.2 G/DL (ref 3.5–5.2)
ALP SERPL-CCNC: 67 U/L (ref 55–135)
ALT SERPL W/O P-5'-P-CCNC: 34 U/L (ref 10–44)
ANION GAP SERPL CALC-SCNC: 12 MMOL/L (ref 8–16)
AST SERPL-CCNC: 27 U/L (ref 10–40)
BILIRUB DIRECT SERPL-MCNC: 0.5 MG/DL (ref 0.1–0.3)
BILIRUB SERPL-MCNC: 1.5 MG/DL (ref 0.1–1)
BUN SERPL-MCNC: 20 MG/DL (ref 6–20)
CALCIUM SERPL-MCNC: 9.7 MG/DL (ref 8.7–10.5)
CHLORIDE SERPL-SCNC: 105 MMOL/L (ref 95–110)
CHOLEST SERPL-MCNC: 170 MG/DL (ref 120–199)
CHOLEST/HDLC SERPL: 3.9 {RATIO} (ref 2–5)
CO2 SERPL-SCNC: 21 MMOL/L (ref 23–29)
COMPLEXED PSA SERPL-MCNC: 2.1 NG/ML (ref 0–4)
CREAT SERPL-MCNC: 1.2 MG/DL (ref 0.5–1.4)
EST. GFR  (NO RACE VARIABLE): >60 ML/MIN/1.73 M^2
ESTIMATED AVG GLUCOSE: 108 MG/DL (ref 68–131)
GLUCOSE SERPL-MCNC: 101 MG/DL (ref 70–110)
HBA1C MFR BLD: 5.4 % (ref 4–5.6)
HCT VFR BLD AUTO: 50.6 % (ref 40–54)
HDLC SERPL-MCNC: 44 MG/DL (ref 40–75)
HDLC SERPL: 25.9 % (ref 20–50)
HGB BLD-MCNC: 16.8 G/DL (ref 14–18)
LDLC SERPL CALC-MCNC: 108 MG/DL (ref 63–159)
NONHDLC SERPL-MCNC: 126 MG/DL
POTASSIUM SERPL-SCNC: 4.3 MMOL/L (ref 3.5–5.1)
PROT SERPL-MCNC: 7.7 G/DL (ref 6–8.4)
SODIUM SERPL-SCNC: 138 MMOL/L (ref 136–145)
TESTOST SERPL-MCNC: 615 NG/DL (ref 304–1227)
TRIGL SERPL-MCNC: 90 MG/DL (ref 30–150)

## 2024-08-22 PROCEDURE — 85014 HEMATOCRIT: CPT | Performed by: UROLOGY

## 2024-08-22 PROCEDURE — 85018 HEMOGLOBIN: CPT | Performed by: UROLOGY

## 2024-08-22 PROCEDURE — 84153 ASSAY OF PSA TOTAL: CPT | Performed by: UROLOGY

## 2024-08-22 PROCEDURE — 36415 COLL VENOUS BLD VENIPUNCTURE: CPT | Mod: PO | Performed by: UROLOGY

## 2024-08-22 PROCEDURE — 83036 HEMOGLOBIN GLYCOSYLATED A1C: CPT | Performed by: INTERNAL MEDICINE

## 2024-08-22 PROCEDURE — 80053 COMPREHEN METABOLIC PANEL: CPT | Performed by: INTERNAL MEDICINE

## 2024-08-22 PROCEDURE — 84403 ASSAY OF TOTAL TESTOSTERONE: CPT | Performed by: UROLOGY

## 2024-08-22 PROCEDURE — 82248 BILIRUBIN DIRECT: CPT | Performed by: INTERNAL MEDICINE

## 2024-08-22 PROCEDURE — 80061 LIPID PANEL: CPT | Performed by: INTERNAL MEDICINE

## 2024-08-28 ENCOUNTER — OFFICE VISIT (OUTPATIENT)
Dept: UROLOGY | Facility: CLINIC | Age: 59
End: 2024-08-28
Payer: COMMERCIAL

## 2024-08-28 VITALS — BODY MASS INDEX: 29.32 KG/M2 | WEIGHT: 204.81 LBS | HEIGHT: 70 IN

## 2024-08-28 DIAGNOSIS — Z80.42 FAMILY HISTORY OF PROSTATE CANCER: ICD-10-CM

## 2024-08-28 DIAGNOSIS — N52.01 ERECTILE DYSFUNCTION DUE TO ARTERIAL INSUFFICIENCY: ICD-10-CM

## 2024-08-28 DIAGNOSIS — N40.0 BPH WITHOUT URINARY OBSTRUCTION: ICD-10-CM

## 2024-08-28 DIAGNOSIS — R79.89 LOW TESTOSTERONE LEVEL IN MALE: Primary | ICD-10-CM

## 2024-08-28 PROCEDURE — 99214 OFFICE O/P EST MOD 30 MIN: CPT | Mod: S$GLB,,, | Performed by: UROLOGY

## 2024-08-28 PROCEDURE — 1159F MED LIST DOCD IN RCRD: CPT | Mod: CPTII,S$GLB,, | Performed by: UROLOGY

## 2024-08-28 PROCEDURE — 3044F HG A1C LEVEL LT 7.0%: CPT | Mod: CPTII,S$GLB,, | Performed by: UROLOGY

## 2024-08-28 PROCEDURE — 3008F BODY MASS INDEX DOCD: CPT | Mod: CPTII,S$GLB,, | Performed by: UROLOGY

## 2024-08-28 PROCEDURE — 4010F ACE/ARB THERAPY RXD/TAKEN: CPT | Mod: CPTII,S$GLB,, | Performed by: UROLOGY

## 2024-08-28 PROCEDURE — 99999 PR PBB SHADOW E&M-EST. PATIENT-LVL III: CPT | Mod: PBBFAC,,, | Performed by: UROLOGY

## 2024-08-28 RX ORDER — DORZOLAMIDE HCL 20 MG/ML
1 SOLUTION/ DROPS OPHTHALMIC 3 TIMES DAILY
COMMUNITY

## 2024-08-28 RX ORDER — LIFITEGRAST 50 MG/ML
1 SOLUTION/ DROPS OPHTHALMIC 2 TIMES DAILY
COMMUNITY

## 2024-08-28 RX ORDER — KETOROLAC TROMETHAMINE 5 MG/ML
SOLUTION OPHTHALMIC
COMMUNITY

## 2024-08-28 RX ORDER — PREDNISOLONE ACETATE 10 MG/ML
1 SUSPENSION/ DROPS OPHTHALMIC 4 TIMES DAILY
COMMUNITY

## 2024-08-28 RX ORDER — TIMOLOL MALEATE 5 MG/ML
1 SOLUTION/ DROPS OPHTHALMIC 2 TIMES DAILY
COMMUNITY

## 2024-08-28 NOTE — PROGRESS NOTES
Subjective:       Patient ID: Ron Weiss is a 59 y.o. male.    Chief Complaint: Low Testosterone    HPI    59-year-old with ED and low testosterone.  He is on IM testosterone replacement.  Current dose is 150 mg every 2 week.  Peak testosterone is 615.  PSA is 2.1 and slightly increased.  He has no voiding complaints.  He denies hematuria and dysuria.   He has a strong family history of prostate cancer.  He wants to monitor his PSA every 6 months.  He is overall doing well.       PSA Diagnostic   Latest Ref Rng 0.00 - 4.00 ng/mL   2/4/2021 1.1    7/20/2021 1.0   3/2/2022 1.1    9/14/2022 0.85    3/29/2023 1.0    8/16/2023 1.3    2/28/2024 0.99   8/22/2024 2.1        Testosterone, Total   Latest Ref Rng 304 - 1227 ng/dL   3/2/2022 981    9/14/2022 1116    3/29/2023 819    8/16/2023 723    2/28/2024 939    8/22/2024 615          Review of Systems   Constitutional:  Negative for fever.   Genitourinary:  Negative for dysuria and hematuria.       Objective:      Physical Exam  Vitals reviewed.   Constitutional:       Appearance: He is well-developed.   Pulmonary:      Effort: Pulmonary effort is normal.   Skin:     Findings: No rash.   Neurological:      Mental Status: He is alert and oriented to person, place, and time.         Assessment:       1. Low testosterone level in male    2. BPH without urinary obstruction    3. Erectile dysfunction due to arterial insufficiency    4. Family history of prostate cancer        Plan:       Low testosterone level in male  -     Testosterone; Future; Expected date: 02/28/2025    BPH without urinary obstruction  -     Prostate Specific Antigen, Diagnostic; Future; Expected date: 02/28/2025    Erectile dysfunction due to arterial insufficiency    Family history of prostate cancer      The testosterone at current dose.  Continue Viagra as needed.  PSA in 6 months and then follow-up in 1 year

## 2024-11-18 DIAGNOSIS — R79.89 LOW TESTOSTERONE LEVEL IN MALE: ICD-10-CM

## 2024-11-18 RX ORDER — TESTOSTERONE CYPIONATE 200 MG/ML
INJECTION, SOLUTION INTRAMUSCULAR
Qty: 10 ML | Refills: 1 | Status: SHIPPED | OUTPATIENT
Start: 2024-11-18

## 2025-01-30 DIAGNOSIS — R79.89 LOW TESTOSTERONE LEVEL IN MALE: Primary | ICD-10-CM

## 2025-01-30 PROBLEM — R94.31 NONSPECIFIC ABNORMAL ELECTROCARDIOGRAM (ECG) (EKG): Status: ACTIVE | Noted: 2025-01-30

## 2025-02-05 ENCOUNTER — LAB VISIT (OUTPATIENT)
Dept: LAB | Facility: HOSPITAL | Age: 60
End: 2025-02-05
Attending: UROLOGY
Payer: COMMERCIAL

## 2025-02-05 DIAGNOSIS — R79.89 LOW TESTOSTERONE LEVEL IN MALE: ICD-10-CM

## 2025-02-05 DIAGNOSIS — N40.0 BPH WITHOUT URINARY OBSTRUCTION: ICD-10-CM

## 2025-02-05 LAB
BASOPHILS # BLD AUTO: 0.05 K/UL (ref 0–0.2)
BASOPHILS NFR BLD: 0.7 % (ref 0–1.9)
COMPLEXED PSA SERPL-MCNC: 1.5 NG/ML (ref 0–4)
DIFFERENTIAL METHOD BLD: NORMAL
EOSINOPHIL # BLD AUTO: 0.1 K/UL (ref 0–0.5)
EOSINOPHIL NFR BLD: 1.8 % (ref 0–8)
ERYTHROCYTE [DISTWIDTH] IN BLOOD BY AUTOMATED COUNT: 13.8 % (ref 11.5–14.5)
HCT VFR BLD AUTO: 51.8 % (ref 40–54)
HCT VFR BLD AUTO: 51.8 % (ref 40–54)
HGB BLD-MCNC: 17.1 G/DL (ref 14–18)
IMM GRANULOCYTES # BLD AUTO: 0.03 K/UL (ref 0–0.04)
IMM GRANULOCYTES NFR BLD AUTO: 0.4 % (ref 0–0.5)
LYMPHOCYTES # BLD AUTO: 2.1 K/UL (ref 1–4.8)
LYMPHOCYTES NFR BLD: 28.4 % (ref 18–48)
MCH RBC QN AUTO: 27.6 PG (ref 27–31)
MCHC RBC AUTO-ENTMCNC: 33 G/DL (ref 32–36)
MCV RBC AUTO: 84 FL (ref 82–98)
MONOCYTES # BLD AUTO: 0.7 K/UL (ref 0.3–1)
MONOCYTES NFR BLD: 10 % (ref 4–15)
NEUTROPHILS # BLD AUTO: 4.3 K/UL (ref 1.8–7.7)
NEUTROPHILS NFR BLD: 58.7 % (ref 38–73)
NRBC BLD-RTO: 0 /100 WBC
PLATELET # BLD AUTO: 232 K/UL (ref 150–450)
PMV BLD AUTO: 10.3 FL (ref 9.2–12.9)
RBC # BLD AUTO: 6.19 M/UL (ref 4.6–6.2)
TESTOST SERPL-MCNC: 842 NG/DL (ref 304–1227)
WBC # BLD AUTO: 7.32 K/UL (ref 3.9–12.7)

## 2025-02-05 PROCEDURE — 36415 COLL VENOUS BLD VENIPUNCTURE: CPT | Mod: PO | Performed by: UROLOGY

## 2025-02-05 PROCEDURE — 84153 ASSAY OF PSA TOTAL: CPT | Performed by: UROLOGY

## 2025-02-05 PROCEDURE — 84403 ASSAY OF TOTAL TESTOSTERONE: CPT | Performed by: UROLOGY

## 2025-02-05 PROCEDURE — 85025 COMPLETE CBC W/AUTO DIFF WBC: CPT | Performed by: UROLOGY

## 2025-02-11 PROBLEM — K80.50 BILIARY COLIC: Status: ACTIVE | Noted: 2025-02-11

## 2025-03-03 ENCOUNTER — TELEPHONE (OUTPATIENT)
Dept: SURGERY | Facility: CLINIC | Age: 60
End: 2025-03-03
Payer: COMMERCIAL

## 2025-03-03 NOTE — NURSING
Oncology Navigation   Intake  Date of Diagnosis: -- (non cancer)  Type of Referral: Internal  Date of Referral: 02/28/25  Initial Nurse Navigator Contact: 03/03/25  Referral to Initial Contact Timeline (days): 3  First Appointment Available: 03/05/25  Appointment Date: 04/02/25  First Available Date vs. Scheduled Date (days): 28  Reason if booked > 7 days after scheduling: Patient request     Treatment     Surgical Oncologist: Dominique  Consult Date: 04/02/25          Procedures: CT  CT Schedule Date: 04/02/25             Support Systems: Spouse/significant other     Acuity      Follow Up  No follow-ups on file.

## 2025-03-31 NOTE — H&P (VIEW-ONLY)
Jerome is a healthy 58yo who underwent laparoscopic cholecystectomy 2/11/2025 for acute cholecystitis. At the time of presentation his liver function was mildly elevated by lfts. His bilirubin was 1.2 but he carries a diagnosis of Gilbert with history of mildly elevated bilirubin.    He had CT in the ER without conference:  EXAMINATION:  CT ABDOMEN AND PELVIS     CLINICAL HISTORY:  Abdominal pain, fever     TECHNIQUE:  Axial CT images were obtained through the abdomen and pelvis without contrast .  Coronal and sagittal reconstructions submitted and interpreted.  Total .  Automated exposure control utilized.     COMPARISON:  None.     FINDINGS:  Visualized lung bases are clear.  Mild gallbladder wall thickening is present with surrounding edema.  No calcified stones or biliary obstruction.  Liver, spleen, adrenals, pancreas and kidneys show no acute findings.  Stomach, small bowel and colon are normal.  The appendix is not identified, however no inflammatory changes are in the right lower quadrant.  No free fluid or pneumoperitoneum.  No enlarged lymph nodes.  Abdominal aorta is normal in caliber.  Urinary bladder is normal.  Prostate is normal size.  No acute osseous findings.     Impression:  Gallbladder wall thickening and pericholecystic fluid could reflect cholecystitis.  No calcified stones are present    His lap jyoti was uneventful, was kept on abx post op for some fevers:  Path:  GALLBLADDER, CHOLECYSTECTOMY:   --SERRATED ADENOMA (APPROXIMATELY 8 MILLIMETERS),    PRESENT AT CYSTIC DUCT MARGIN.   --NO MORPHOLOGIC EVIDENCE OF HIGH-GRADE DYSPLASIA OR    MALIGNANCY.     A/P:  Healthy 59yoM with incidental finding of gallbladder polyp w positive margins without HGD. We discussed this finding and our options which really include surveillance, re-resection of his duct to (hopefully) negative margin, and ERCP with spyglass to evaluate the extent of the polyp. Given the small size and lack of any higher grade  feature, I would favor better cross sectional imaging followed by robotic cystic duct resection and pathologic evaluation. I don't think we lose much with MIS re-resection here even if there is more extensive polyp, but could potentially spare him unnecessary ERCP and its risks if we find no further disease or get a negative margin.    I spent 45 minutes with Mr. Weiss, with >50% of time spent face to face and additional time preparing to see the patient (eg, review of tests), obtaining and/or reviewing separately obtained history, documenting clinical information in the electronic or other health record, independently interpreting results (not separately reported) and communicating results to the patient/family/caregiver, or Care coordination (not separately reported).           Robert Foster MD  Upper GI / Hepatobiliary Surgical Oncology  [facility] [place], [place]  Office: [phone]  Fax: [phone]

## 2025-03-31 NOTE — PROGRESS NOTES
Jerome is a healthy 60yo who underwent laparoscopic cholecystectomy 2/11/2025 for acute cholecystitis. At the time of presentation his liver function was mildly elevated by lfts. His bilirubin was 1.2 but he carries a diagnosis of Gilbert with history of mildly elevated bilirubin.    He had CT in the ER without conference:  EXAMINATION:  CT ABDOMEN AND PELVIS     CLINICAL HISTORY:  Abdominal pain, fever     TECHNIQUE:  Axial CT images were obtained through the abdomen and pelvis without contrast .  Coronal and sagittal reconstructions submitted and interpreted.  Total .  Automated exposure control utilized.     COMPARISON:  None.     FINDINGS:  Visualized lung bases are clear.  Mild gallbladder wall thickening is present with surrounding edema.  No calcified stones or biliary obstruction.  Liver, spleen, adrenals, pancreas and kidneys show no acute findings.  Stomach, small bowel and colon are normal.  The appendix is not identified, however no inflammatory changes are in the right lower quadrant.  No free fluid or pneumoperitoneum.  No enlarged lymph nodes.  Abdominal aorta is normal in caliber.  Urinary bladder is normal.  Prostate is normal size.  No acute osseous findings.     Impression:  Gallbladder wall thickening and pericholecystic fluid could reflect cholecystitis.  No calcified stones are present    His lap jyoti was uneventful, was kept on abx post op for some fevers:  Path:  GALLBLADDER, CHOLECYSTECTOMY:   --SERRATED ADENOMA (APPROXIMATELY 8 MILLIMETERS),    PRESENT AT CYSTIC DUCT MARGIN.   --NO MORPHOLOGIC EVIDENCE OF HIGH-GRADE DYSPLASIA OR    MALIGNANCY.     A/P:  Healthy 59yoM with incidental finding of gallbladder polyp w positive margins without HGD. We discussed this finding and our options which really include surveillance, re-resection of his duct to (hopefully) negative margin, and ERCP with spyglass to evaluate the extent of the polyp. Given the small size and lack of any higher grade  feature, I would favor better cross sectional imaging followed by robotic cystic duct resection and pathologic evaluation. I don't think we lose much with MIS re-resection here even if there is more extensive polyp, but could potentially spare him unnecessary ERCP and its risks if we find no further disease or get a negative margin.    I spent 45 minutes with Mr. Weiss, with >50% of time spent face to face and additional time preparing to see the patient (eg, review of tests), obtaining and/or reviewing separately obtained history, documenting clinical information in the electronic or other health record, independently interpreting results (not separately reported) and communicating results to the patient/family/caregiver, or Care coordination (not separately reported).           Robert Foster MD  Upper GI / Hepatobiliary Surgical Oncology  [facility] [place], [place]  Office: [phone]  Fax: [phone]

## 2025-04-02 ENCOUNTER — OFFICE VISIT (OUTPATIENT)
Dept: HEMATOLOGY/ONCOLOGY | Facility: CLINIC | Age: 60
End: 2025-04-02
Payer: COMMERCIAL

## 2025-04-02 ENCOUNTER — TELEPHONE (OUTPATIENT)
Dept: SURGERY | Facility: CLINIC | Age: 60
End: 2025-04-02
Payer: COMMERCIAL

## 2025-04-02 VITALS
BODY MASS INDEX: 30.84 KG/M2 | WEIGHT: 214.94 LBS | SYSTOLIC BLOOD PRESSURE: 139 MMHG | DIASTOLIC BLOOD PRESSURE: 90 MMHG

## 2025-04-02 DIAGNOSIS — D13.5: ICD-10-CM

## 2025-04-02 PROCEDURE — 3080F DIAST BP >= 90 MM HG: CPT | Mod: CPTII,S$GLB,, | Performed by: SURGERY

## 2025-04-02 PROCEDURE — 99204 OFFICE O/P NEW MOD 45 MIN: CPT | Mod: S$GLB,,, | Performed by: SURGERY

## 2025-04-02 PROCEDURE — 3008F BODY MASS INDEX DOCD: CPT | Mod: CPTII,S$GLB,, | Performed by: SURGERY

## 2025-04-02 PROCEDURE — 99999 PR PBB SHADOW E&M-EST. PATIENT-LVL III: CPT | Mod: PBBFAC,,, | Performed by: SURGERY

## 2025-04-02 PROCEDURE — 4010F ACE/ARB THERAPY RXD/TAKEN: CPT | Mod: CPTII,S$GLB,, | Performed by: SURGERY

## 2025-04-02 PROCEDURE — 3075F SYST BP GE 130 - 139MM HG: CPT | Mod: CPTII,S$GLB,, | Performed by: SURGERY

## 2025-04-02 RX ORDER — ATROPINE SULFATE 10 MG/ML
1 SOLUTION OPHTHALMIC DAILY
COMMUNITY

## 2025-04-02 RX ORDER — BRIMONIDINE TARTRATE 2 MG/ML
1 SOLUTION/ DROPS OPHTHALMIC 3 TIMES DAILY
COMMUNITY

## 2025-04-02 RX ORDER — OFLOXACIN 3 MG/ML
1 SOLUTION/ DROPS OPHTHALMIC 4 TIMES DAILY
COMMUNITY

## 2025-04-02 NOTE — TELEPHONE ENCOUNTER
Call placed to pt. Reviewed all appt details for MRI MRCP. Informed pt per Tigist at [facility], facility will coordinate IV start with PICC team. Pt requesting to have labs drawn day before scan. Appt scheduled per pt preferred date time and location. Details reviewed. Pt verbalized understanding and thanked this RN for the follow up.

## 2025-04-02 NOTE — TELEPHONE ENCOUNTER
Call placed to [facility] radiology ([phone]). Spoke to Tigist.  Informed pt reports he is a difficult IV start and needs anesthesia and u/s to start IV for imaging. Per radiology, scan be done and radiology will coordinate with PICC team for IV start. Fast 4 hours. Report to facility 124.

## 2025-04-02 NOTE — PROGRESS NOTES
Spoke to patient and spouse. Confirmed procedure for 5/2/2025 with Dr. Fotser.  Informed to arrive at the [facility] on the 2nd floor [address] [facility] for 0500  and surgery time is 0700. RN will call  day before to confirm surgery and arrival time. Surgery Instructions provided as follows:  instructed to remain NPO solids 8 hours prior to surgery, clear liquids until 2 hours prior to surgery, to shower with hibiclens/antibacterial soap the night before surgery and morning of surgery before arrival, not to apply any lotions, powders or deodorant, remove all metal and jewelry, to wear comfortable clothes, and leave all valuables at home. Medications reviewed and  instructed to bring medications on DOS. Pt denies anticoagulation. Pre op department will call to review medications and advise if medications need to be taken day of surgery. Confirmed pt  will have transportation home.   Post operative instructions reviewed. Tentative post op appt scheduled. Details provided. Pt provided  surgery instructions including eating and drinking instructions and map,  disability instructions,  and  instructed to bring surgery folder  on DOS.  Pt reports he is a difficult IV stick for imaging and needs anesthesia and ultrasound. Will reschedule MRCP to hospital and call pt once rescheduled. Offered VV with Rani GILLILAND prior to surgery. Pt will call if he would like to schedule VV.   Pt  and spouse verbalized understanding to all of the above and instructed to contact us for any questions.

## 2025-04-14 ENCOUNTER — LAB VISIT (OUTPATIENT)
Dept: LAB | Facility: HOSPITAL | Age: 60
End: 2025-04-14
Attending: SURGERY
Payer: COMMERCIAL

## 2025-04-14 DIAGNOSIS — D13.5: ICD-10-CM

## 2025-04-14 LAB
ABSOLUTE EOSINOPHIL (OHS): 0.12 K/UL
ABSOLUTE MONOCYTE (OHS): 0.74 K/UL (ref 0.3–1)
ABSOLUTE NEUTROPHIL COUNT (OHS): 4.37 K/UL (ref 1.8–7.7)
ALBUMIN SERPL BCP-MCNC: 3.9 G/DL (ref 3.5–5.2)
ALP SERPL-CCNC: 64 UNIT/L (ref 40–150)
ALT SERPL W/O P-5'-P-CCNC: 31 UNIT/L (ref 10–44)
ANION GAP (OHS): 8 MMOL/L (ref 8–16)
AST SERPL-CCNC: 24 UNIT/L (ref 11–45)
BASOPHILS # BLD AUTO: 0.04 K/UL
BASOPHILS NFR BLD AUTO: 0.6 %
BILIRUB SERPL-MCNC: 0.8 MG/DL (ref 0.1–1)
BUN SERPL-MCNC: 16 MG/DL (ref 6–20)
CALCIUM SERPL-MCNC: 8.5 MG/DL (ref 8.7–10.5)
CHLORIDE SERPL-SCNC: 102 MMOL/L (ref 95–110)
CO2 SERPL-SCNC: 24 MMOL/L (ref 23–29)
CREAT SERPL-MCNC: 1.1 MG/DL (ref 0.5–1.4)
ERYTHROCYTE [DISTWIDTH] IN BLOOD BY AUTOMATED COUNT: 14.6 % (ref 11.5–14.5)
GFR SERPLBLD CREATININE-BSD FMLA CKD-EPI: >60 ML/MIN/1.73/M2
GLUCOSE SERPL-MCNC: 111 MG/DL (ref 70–110)
HCT VFR BLD AUTO: 49.1 % (ref 40–54)
HGB BLD-MCNC: 16.6 GM/DL (ref 14–18)
IMM GRANULOCYTES # BLD AUTO: 0.03 K/UL (ref 0–0.04)
IMM GRANULOCYTES NFR BLD AUTO: 0.5 % (ref 0–0.5)
LYMPHOCYTES # BLD AUTO: 1.06 K/UL (ref 1–4.8)
MCH RBC QN AUTO: 28 PG (ref 27–31)
MCHC RBC AUTO-ENTMCNC: 33.8 G/DL (ref 32–36)
MCV RBC AUTO: 83 FL (ref 82–98)
NUCLEATED RBC (/100WBC) (OHS): 0 /100 WBC
PLATELET # BLD AUTO: 158 K/UL (ref 150–450)
PMV BLD AUTO: 9.4 FL (ref 9.2–12.9)
POTASSIUM SERPL-SCNC: 4.3 MMOL/L (ref 3.5–5.1)
PROT SERPL-MCNC: 7.7 GM/DL (ref 6–8.4)
RBC # BLD AUTO: 5.93 M/UL (ref 4.6–6.2)
RELATIVE EOSINOPHIL (OHS): 1.9 %
RELATIVE LYMPHOCYTE (OHS): 16.7 % (ref 18–48)
RELATIVE MONOCYTE (OHS): 11.6 % (ref 4–15)
RELATIVE NEUTROPHIL (OHS): 68.7 % (ref 38–73)
SODIUM SERPL-SCNC: 134 MMOL/L (ref 136–145)
WBC # BLD AUTO: 6.36 K/UL (ref 3.9–12.7)

## 2025-04-14 PROCEDURE — 80053 COMPREHEN METABOLIC PANEL: CPT | Mod: PN

## 2025-04-14 PROCEDURE — 36415 COLL VENOUS BLD VENIPUNCTURE: CPT | Mod: PN

## 2025-04-14 PROCEDURE — 85025 COMPLETE CBC W/AUTO DIFF WBC: CPT | Mod: PN

## 2025-04-28 ENCOUNTER — ANESTHESIA EVENT (OUTPATIENT)
Dept: SURGERY | Facility: HOSPITAL | Age: 60
End: 2025-04-28
Payer: COMMERCIAL

## 2025-04-28 ENCOUNTER — TELEPHONE (OUTPATIENT)
Dept: PREADMISSION TESTING | Facility: HOSPITAL | Age: 60
End: 2025-04-28
Payer: COMMERCIAL

## 2025-04-28 NOTE — DISCHARGE INSTRUCTIONS
Your surgery has been scheduled for:___5/2/2025_    You should report to:[facility], located on the [facility] side of the    Second floor of the [facility] ([phone])    Please Note   Tell your doctor if you take Aspirin, products containing Aspirin, herbal medications  or blood thinners, such as Coumadin, Ticlid, or Plavix.  (Consult your provider regarding holding or stopping before surgery).  Arrange for someone to drive you home following surgery.  You will not be allowed to leave the surgical facility alone or drive yourself home following sedation and anesthesia.    Before Surgery  Stop taking all herbal medications, vitamins, and supplements 7 days prior to surgery  No Motrin/Advil (Ibuprofen) 7 days before surgery  No Aleve (Naproxen) 7 days before surgery  Stop Taking Asprin, products containing Asprin __7___days before surgery  Stop taking blood thinners_______days before surgery  No Goody's/BC  Powder 7 days before surgery  Refrain from drinking alcoholic beverages for 24hours before and after surgery  Stop or limit smoking _____7____days before surgery  You may take Tylenol for pain    Night before Surgery  Do not eat or drink after midnight  Take a shower or bath (shower is recommended).  Bathe with Hibiclens soap or an antibacterial soap from the neck down.  If not supplied by your surgeon, hibiclens soap will need to be purchased over the counter in pharmacy.  Rinse soap off thoroughly.  Shampoo your hair with your regular shampoo    The Day of Surgery  Take another bath or shower with hibiclens or any antibacterial soap, to reduce the chance of infection.  Take heart and blood pressure medications with a small sip of water, as advised by the perioperative team.  Do not take fluid pills  You may brush your teeth and rinse your mouth, but do not swall any additional water.   Do not apply perfumes, powder, body lotions or deodorant on the day of  surgery.  Nail polish should be removed.  Do not wear makeup or moisturizer  Wear comfortable clothes, such as a button front shirt and loose fitting pants.  Leave all jewelry, including body piercings, and valuables at home.    Bring any devices you will neeed after surgery such as crutches or canes.  If you have sleep apnea, please bring your CPAP machine  In the event that your physical condition changes including the onset of a cold or respiratory illness, or if you have to delay or cancel your surgery, please notify your surgeon.     Anesthesia: General Anesthesia     You are watched continuously during your procedure by your anesthesia provider.     Youre due to have surgery. During surgery, youll be given medicine called anesthesia or anesthetic. This will keep you comfortable and pain-free. Your anesthesia provider will use general anesthesia.  What is general anesthesia?  General anesthesia puts you into a state like deep sleep. It goes into the bloodstream (IV anesthetics), into the lungs (gas anesthetics), or both. You feel nothing during the procedure. You will not remember it. During the procedure, the anesthesia provider monitors you continuously. He or she checks your heart rate and rhythm, blood pressure, breathing, and blood oxygen.  IV anesthetics. IV anesthetics are given through an IV line in your arm. Theyre often given first. This is so you are asleep before a gas anesthetic is started. Some kinds of IV anesthetics relieve pain. Others relax you. Your doctor will decide which kind is best in your case.  Gas anesthetics. Gas anesthetics are breathed into the lungs. They are often used to keep you asleep. They can be given through a facemask or a tube placed in your larynx or trachea (breathing tube).  If you have a facemask, your anesthesia provider will most likely place it over your nose and mouth while youre still awake. Youll breathe oxygen through the mask as your IV anesthetic is  started. Gas anesthetic may be added through the mask.  If you have a tube in the larynx or trachea, it will be inserted into your throat after youre asleep.  Anesthesia tools and medicines  You will likely have:  IV anesthetics. These are put into an IV line into your bloodstream.  Gas anesthetics. You breathe these anesthetics into your lungs, where they pass into your bloodstream.  Pulse oximeter. This is a small clip that is attached to the end of your finger. This measures your blood oxygen level.  Electrocardiography leads (electrodes). These are small sticky pads that are placed on your chest. They record your heart rate and rhythm.  Blood pressure cuff. This reads your blood pressure.  Risks and possible complications  General anesthesia has some risks. These include:  Breathing problems  Nausea and vomiting  Sore throat or hoarseness (usually temporary)  Allergic reaction to the anesthetic  Irregular heartbeat (rare)  Cardiac arrest (rare)   Anesthesia safety  Follow all instructions you are given for how long not to eat or drink before your procedure.  Be sure your doctor knows what medicines and drugs you take. This includes over-the-counter medicines, herbs, supplements, alcohol or other drugs. You will be asked when those were last taken.  Have an adult family member or friend drive you home after the procedure.  For the first 24 hours after your surgery:  Do not drive or use heavy equipment.  Do not make important decisions or sign legal documents. If important decisions or signing legal documents is necessary during the first 24 hours after surgery, have a trusted family member or spouse act on your behalf.  Avoid alcohol.  Have a responsible adult stay with you. He or she can watch for problems and help keep you safe.  Date Last Reviewed: 12/1/2016 © 2000-2017 [employer]. [address] [place] [address], [place] [address]. All rights reserved. This information is not intended as a substitute  for professional medical care. Always follow your healthcare professional's instructions.

## 2025-04-28 NOTE — PRE-PROCEDURE INSTRUCTIONS
Called to inquire about special needs for an Anesthesia visit on 4/29/2025 at 10AM. Spoke to this patient Jerome. Per Jerome he still has a gas bubble in the eye from his Ophthalmologist Fe. The gas is still present and will be absorbed over time. 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 Pt has a braclet with information noted about Nitrous oxide. Notified Dr. Dewey regarding this matter. The patient does not Warrant a POC visit in the perioperative care center. This information will be noted on an Anesthesia report.

## 2025-04-28 NOTE — ANESTHESIA PREPROCEDURE EVALUATION
[facility]  Anesthesia Pre-Operative Evaluation        Patient Name: Ron Weiss  YOB: 1965  MRN: [id number]    SUBJECTIVE:     Pre-operative Evaluation for Procedure(s) (LRB):  XI ROBOTIC CHOLECYSTECTOMY, robotic cystic duct excision (N/A)     05/01/2025    Ron Weiss is a 59 y.o. male with a PMHx significant for HTN, GERD, KUSH, Obesity (BMI 31), Macular hole repair (must avoid nitrous). He underwent a laparoscopic jyoti recently and had incidental finding of gallbladder polyp w positive margins.     He now presents for the above procedure(s) with Surgical Oncology - Dr. Foster.    Previous Airway: None documented.    Problem List[1]    Review of patient's allergies indicates:   Allergen Reactions    Nitrous oxide Shortness Of Breath     Temporary because of procedure    Contrast media     Iodinated contrast media Other (See Comments)     9 yrs of  age ; unsure  of rxn.       Current Outpatient Medications   Medication Instructions    amLODIPine (NORVASC) 10 mg, Oral, Daily    ascorbic acid (vitamin C) (VITAMIN C) 500 mg    aspirin 81 mg, Daily    atropine sulfate, PF, 1 % Dpet 1 drop, Ophthalmic, Daily    brimonidine 0.2% (ALPHAGAN) 0.2 % Drop 1 drop, Left Eye, 3 times daily    dorzolamide (TRUSOPT) 2 % ophthalmic solution 1 drop, 3 times daily    fish oil-omega-3 fatty acids 300-1,000 mg capsule 2 g, Daily    fluticasone (FLONASE) 50 mcg/actuation nasal spray 1 spray, Daily PRN    ibuprofen (ADVIL,MOTRIN) 400 mg, Oral, Every 6 hours PRN    ketorolac 0.5% (ACULAR) 0.5 % Drop Place into both eyes.    levocetirizine (XYZAL) 5 mg, Nightly    multivitamin (THERAGRAN) tablet 1 tablet, Daily    ofloxacin (OCUFLOX) 0.3 % ophthalmic solution 1 drop, Left Eye, 4 times daily    pantoprazole (PROTONIX) 40 MG tablet 1 tablet, Daily    prednisoLONE acetate (PRED FORTE) 1 % DrpS 1 drop, 4 times daily    ramipriL (ALTACE) 10 mg, Oral, Daily    sildenafiL  "(VIAGRA) 100 mg, Oral, Daily PRN    testosterone cypionate (DEPOTESTOTERONE CYPIONATE) 200 mg/mL injection ADMINISTER 0.75 ML(150 MG) IN THE MUSCLE EVERY 14 DAYS    timolol maleate 0.5% (TIMOPTIC) 0.5 % Drop 1 drop, 2 times daily    XIIDRA 5 % Dpet 1 drop, 2 times daily       Past Surgical History:   Procedure Laterality Date    ADENOIDECTOMY      COLONOSCOPY      Normal age 49, plan for 10 yr.    COLONOSCOPY N/A 9/19/2024    Procedure: COLONOSCOPY;  Surgeon: Cameron Burns Jr., MD;  Location: [facility] ENDO;  Service: Endoscopy;  Laterality: N/A;    LAPAROSCOPIC CHOLECYSTECTOMY N/A 2/11/2025    Procedure: CHOLECYSTECTOMY, LAPAROSCOPIC;  Surgeon: Jeanie Baig MD;  Location: [facility] OR;  Service: General;  Laterality: N/A;    ROTATOR CUFF REPAIR      TONSILLECTOMY         Social History     Substance and Sexual Activity   Drug Use No     Alcohol Use: Not At Risk (3/26/2025)    AUDIT-C     Frequency of Alcohol Consumption: 2-3 times a week     Average Number of Drinks: 1 or 2     Frequency of Binge Drinking: Never     Tobacco Use: Low Risk  (4/2/2025)    Patient History     Smoking Tobacco Use: Never     Smokeless Tobacco Use: Never     Passive Exposure: Not on file       OBJECTIVE:     Vital Signs Range (Last 24H):         Significant Labs    Heme Profile  Lab Results   Component Value Date    WBC 6.36 04/14/2025    HGB 16.6 04/14/2025    HCT 49.1 04/14/2025     04/14/2025       Coagulation Studies  No results found for: "LABPROT", "INR", "APTT"    BMP  Lab Results   Component Value Date     (L) 04/14/2025    K 4.3 04/14/2025     04/14/2025    CO2 24 04/14/2025    BUN 16 04/14/2025    CREATININE 1.1 04/14/2025    PHOS 1.7 (L) 04/01/2021       Liver Function Tests  Lab Results   Component Value Date    AST 24 04/14/2025    ALT 31 04/14/2025    ALKPHOS 64 04/14/2025    BILITOT 0.8 04/14/2025    PROT 7.7 04/14/2025    ALBUMIN 3.9 04/14/2025       Lipid Profile  Lab Results   Component " Value Date    CHOL 170 08/22/2024    HDL 44 08/22/2024    TRIG 90 08/22/2024       Endocrine Profile  Lab Results   Component Value Date    HGBA1C 5.4 08/22/2024       Diagnostic Studies    Cardiac Studies    EKG:   Results for orders placed or performed during the hospital encounter of 04/01/21   EKG 12-lead    Collection Time: 04/01/21  9:52 AM    Narrative    Test Reason : Z00.00,    Vent. Rate : 095 BPM     Atrial Rate : 095 BPM     P-R Int : 142 ms          QRS Dur : 072 ms      QT Int : 344 ms       P-R-T Axes : 078 -21 071 degrees     QTc Int : 432 ms    Normal sinus rhythm  Right atrial enlargement  Otherwise normal ECG  When compared with ECG of 01-OCT-2019 09:07,  No significant change was found  Confirmed by Susan Ochoa MD ([id number]) on 4/1/2021 10:54:45 AM    Referred By: ANALY HURLEY           Confirmed By:Susan Ochoa MD       Transthoracic Echo:  Results for orders placed in visit on 01/19/21    Echo Color Flow Doppler? Yes    Interpretation Summary  · The left ventricle is normal in size with normal systolic function. The estimated ejection fraction is 70%  · Normal left ventricular diastolic function.  · Normal right ventricular size with normal right ventricular systolic function.      Nuclear Stress Echo  Results for orders placed during the hospital encounter of 03/01/21    Nuclear Stress - Cardiology Interpreted    Interpretation Summary    The EKG portion of this study is negative for ischemia.    Normal myocardial perfusion scan.    The gated perfusion images showed an ejection fraction of 71%.    LV cavity size is normal.        ASSESSMENT/PLAN:     NOTE:                     DO NOT USE NITROUS OXIDE  Patient had macular hole repair and has air bubbles in his eyes - nitrous oxide use could cause blindness.    Pre-op Assessment    I have reviewed the Patient Summary Reports.     I have reviewed the Nursing Notes. I have reviewed the NPO Status.   I have reviewed the Medications.      Review of Systems  Social:  Non-Smoker       Hematology/Oncology:  Hematology Normal   Oncology Normal                                   EENT/Dental:  EENT/Dental Normal           Cardiovascular:     Hypertension                                          Pulmonary:        Sleep Apnea                Renal/:  Renal/ Normal                 Hepatic/GI:     GERD                Musculoskeletal:  Musculoskeletal Normal                Neurological:  Neurology Normal                                      Endocrine:  Endocrine Normal          Obesity / BMI > 30  Psych:  Psychiatric Normal                     Anesthesia Plan  Type of Anesthesia, risks & benefits discussed:    Anesthesia Type: Gen ETT  Intra-op Monitoring Plan: Standard ASA Monitors  Post Op Pain Control Plan: multimodal analgesia and IV/PO Opioids PRN  Induction:  IV  Airway Plan: Direct, Post-Induction  Informed Consent: Informed consent signed with the Patient and all parties understand the risks and agree with anesthesia plan.  All questions answered.   ASA Score: 3  Day of Surgery Review of History & Physical: H&P Update referred to the surgeon/provider.    Ready For Surgery From Anesthesia Perspective.   .             [1]  Patient Active Problem List  Diagnosis    Gastroesophageal reflux disease with esophagitis    Obstructive sleep apnea    HTN (hypertension)    Male hypogonadism    Chronic vasomotor rhinitis    Family history of prostate cancer in father    Nonspecific abnormal electrocardiogram (ECG) (EKG)    Biliary colic

## 2025-04-28 NOTE — ANESTHESIA PAT ROS NOTE
04/28/2025  Ron Weiss is a 59 y.o., male S/P 2/11/2025 GALLBLADDER, CHOLECYSTECTOMY: --SERRATED ADENOMA (APPROXIMATELY 8 MILLIMETERS),  PRESENT AT CYSTIC DUCT MARGIN.   --NO MORPHOLOGIC EVIDENCE OF HIGH-GRADE DYSPLASIA OR    MALIGNANCY.       4/10/2025    SURGEON'S NOTE  A/P:  Healthy 59yoM with incidental finding of gallbladder polyp w positive margins without HGD. We discussed this finding and our options which really include surveillance, re-resection of his duct to (hopefully) negative margin, and ERCP with spyglass to evaluate the extent of the polyp. Given the small size and lack of any higher grade feature, I would favor better cross sectional imaging followed by robotic cystic duct resection and pathologic evaluation. I don't think we lose much with MIS re-resection here even if there is more extensive polyp, but could potentially spare him unnecessary ERCP and its risks if we find no further disease or get a negative margin.     I spent 45 minutes with Mr. Weiss, with >50% of time spent face to face and additional time preparing to see the patient (eg, review of tests), obtaining and/or reviewing separately obtained history, documenting clinical information in the electronic or other health record, independently interpreting results (not separately reported) and communicating results to the patient/family/caregiver, or Care coordination (not separately reported).         Robert Foster MD  Upper GI / Hepatobiliary Surgical Oncology  [facility] [place], [place]  Office:[phone]  Fax:[phone]           Electronically signed by Robert Foster MD at 4/10/2025  7:00 AM        Pre-op Assessment    I have reviewed the Patient Summary Reports.     I have reviewed the Nursing Notes. I have reviewed the NPO Status.   I have reviewed the Medications.     Review of  Systems  Social:  Alcohol Use, Non-Smoker Smoking  Never  Smokeless Tobacco  Never  Alcohol  2.0 standard drinks of alcohol/week      Employment Status  Retired  Employer  [employer]    Patient Contacts (1/1)  Shelley Weiss  Spouse  Emergency Contact  [phone]  (+1)  [email or web address]  [address] [facility] [address]       Family History   Mother  Diabetes  Father  Diabetes    Coronary artery disease    Prostate cancer  Brother Diabetes                EENT/Dental:   Eyes:              3/20/2025 TOURO  SD VITRECTOMY PARS PLANA REMOVE INT MEMB RETINA   SD VITRECTOMY PARS PLANA REMOVE INT MEMB RETINA   PARS PLANA VITRECTOMY            Cardiovascular:     Hypertension                                          Pulmonary:        Sleep Apnea                Hepatic/GI:     GERD, well controlled                Endocrine:        Obesity / BMI > 30           Anesthesia Assessment: Preoperative EQUATION    Planned Procedure: XI ROBOTIC CHOLECYSTECTOMY, robotic cystic duct excision   Requested Anesthesia Type:GENERAL  Surgeon: Robert Foster MD   Service: Upper GI / Hepatobiliary Surgical Oncology  Known or anticipated Date of Surgery: 5/2/2025

## 2025-04-29 ENCOUNTER — HOSPITAL ENCOUNTER (OUTPATIENT)
Dept: PREADMISSION TESTING | Facility: HOSPITAL | Age: 60
Discharge: HOME | End: 2025-04-29
Payer: COMMERCIAL

## 2025-04-29 NOTE — PRE-PROCEDURE INSTRUCTIONS
PreOp Instructions given:   - Verbal medication information (what to hold and what to take)   - NPO guidelines 2300  - Arrival place directions given; time to be given the day before procedure by the   Surgeon's Office DOSC  - Bathing with antibacterial soap   - Don't wear any jewelry or bring any valuables AM of surgery   - No makeup or moisturizer to face   - No perfume/cologne, powder, lotions or aftershave   Pt. verbalized understanding.   Pt denies any h/o Anesthesia/Sedation complications or side effects.  Patient does not know arrival time.  Explained that this information comes from the surgeon's office and if they haven't heard from them by 2 or 3 pm to call the office.  Patient stated an understanding.     Nitrous Oxide Contraindication   Very difficult IV stick

## 2025-05-01 ENCOUNTER — TELEPHONE (OUTPATIENT)
Dept: SURGERY | Facility: CLINIC | Age: 60
End: 2025-05-01
Payer: COMMERCIAL

## 2025-05-01 DIAGNOSIS — D13.5: Primary | ICD-10-CM

## 2025-05-01 RX ORDER — ACETAMINOPHEN 500 MG
1000 TABLET ORAL
Status: CANCELLED | OUTPATIENT
Start: 2025-05-01

## 2025-05-01 RX ORDER — CELECOXIB 200 MG/1
400 CAPSULE ORAL
Status: CANCELLED | OUTPATIENT
Start: 2025-05-01

## 2025-05-01 RX ORDER — HEPARIN SODIUM 5000 [USP'U]/ML
5000 INJECTION, SOLUTION INTRAVENOUS; SUBCUTANEOUS
Status: CANCELLED | OUTPATIENT
Start: 2025-05-01

## 2025-05-01 RX ORDER — METRONIDAZOLE 500 MG/100ML
500 INJECTION, SOLUTION INTRAVENOUS
Status: CANCELLED | OUTPATIENT
Start: 2025-05-01

## 2025-05-01 NOTE — TELEPHONE ENCOUNTER
Spoke to patient . Confirmed procedure for 5/2/2025 with Dr. PRISCILA Foster.  Informed to arrive at the Day of Surgery Center on the 2nd floor [address] [facility] for 0500  and surgery time is 0700. Surgery Instructions provided as follows:  instructed to remain NPO solids 8 hours prior to surgery, clear liquids until 2 hours prior to surgery, to shower with hibiclens/antibacterial soap the night before surgery and morning of surgery before arrival, not to apply any lotions, powders or deodorant, remove all metal and jewelry, to wear comfortable clothes, and leave all valuables at home. Medications reviewed and  instructed to bring medications on DOS. Pt confirmed he spoke to PAT nurse. Pt confirms he has a NITROUS OXIDE CONTRAINDICATION and he informed PAT nurse. Instructed him to notify anesthesia team tomorrow.  Confirmed pt  will have transportation home and spouse will accompany pt on DOS.   Post operative instructions reviewed. Post op appt details provided. Pt  instructed to bring surgery folder  on DOS. Pt  verbalized understanding to all of the above and instructed to contact us for any questions.   us oxide contraindicated

## 2025-05-01 NOTE — PROGRESS NOTES
Chart reviewed including recent labs, medications, pertinent recent visits. Surgery orders placed.     
oral

## 2025-05-02 ENCOUNTER — HOSPITAL ENCOUNTER (OUTPATIENT)
Facility: HOSPITAL | Age: 60
Discharge: HOME | End: 2025-05-02
Attending: SURGERY | Admitting: SURGERY
Payer: COMMERCIAL

## 2025-05-02 ENCOUNTER — ANESTHESIA (OUTPATIENT)
Dept: SURGERY | Facility: HOSPITAL | Age: 60
End: 2025-05-02
Payer: COMMERCIAL

## 2025-05-02 DIAGNOSIS — D13.5: ICD-10-CM

## 2025-05-02 LAB
INDIRECT COOMBS: NORMAL
RH BLD: NORMAL

## 2025-05-02 PROCEDURE — 71000015 HC POSTOP RECOV 1ST HR: Performed by: SURGERY

## 2025-05-02 PROCEDURE — 25000003 PHARM REV CODE 250

## 2025-05-02 PROCEDURE — 88305 TISSUE EXAM BY PATHOLOGIST: CPT | Mod: TC | Performed by: SURGERY

## 2025-05-02 PROCEDURE — 86850 RBC ANTIBODY SCREEN: CPT

## 2025-05-02 PROCEDURE — 63600175 PHARM REV CODE 636 W HCPCS

## 2025-05-02 PROCEDURE — 71000044 HC DOSC ROUTINE RECOVERY FIRST HOUR: Performed by: SURGERY

## 2025-05-02 PROCEDURE — 36000710: Performed by: SURGERY

## 2025-05-02 PROCEDURE — 47579 UNLISTED LAPS PX BILIARY TRC: CPT | Mod: ,,, | Performed by: SURGERY

## 2025-05-02 PROCEDURE — 37000009 HC ANESTHESIA EA ADD 15 MINS: Performed by: SURGERY

## 2025-05-02 PROCEDURE — 27201423 OPTIME MED/SURG SUP & DEVICES STERILE SUPPLY: Performed by: SURGERY

## 2025-05-02 PROCEDURE — 37000008 HC ANESTHESIA 1ST 15 MINUTES: Performed by: SURGERY

## 2025-05-02 PROCEDURE — 36000711: Performed by: SURGERY

## 2025-05-02 RX ORDER — FENTANYL CITRATE 50 UG/ML
25 INJECTION, SOLUTION INTRAMUSCULAR; INTRAVENOUS EVERY 5 MIN PRN
Status: DISCONTINUED | OUTPATIENT
Start: 2025-05-02 | End: 2025-05-02 | Stop reason: HOSPADM

## 2025-05-02 RX ORDER — HYDROMORPHONE HYDROCHLORIDE 1 MG/ML
INJECTION, SOLUTION INTRAMUSCULAR; INTRAVENOUS; SUBCUTANEOUS
Status: DISCONTINUED | OUTPATIENT
Start: 2025-05-02 | End: 2025-05-02

## 2025-05-02 RX ORDER — KETAMINE HCL IN 0.9 % NACL 50 MG/5 ML
SYRINGE (ML) INTRAVENOUS
Status: DISCONTINUED | OUTPATIENT
Start: 2025-05-02 | End: 2025-05-02

## 2025-05-02 RX ORDER — HYDROMORPHONE HYDROCHLORIDE 1 MG/ML
0.2 INJECTION, SOLUTION INTRAMUSCULAR; INTRAVENOUS; SUBCUTANEOUS EVERY 10 MIN PRN
Status: DISCONTINUED | OUTPATIENT
Start: 2025-05-02 | End: 2025-05-02 | Stop reason: HOSPADM

## 2025-05-02 RX ORDER — CELECOXIB 200 MG/1
400 CAPSULE ORAL
Status: DISCONTINUED | OUTPATIENT
Start: 2025-05-02 | End: 2025-05-02 | Stop reason: HOSPADM

## 2025-05-02 RX ORDER — HEPARIN SODIUM 5000 [USP'U]/ML
5000 INJECTION, SOLUTION INTRAVENOUS; SUBCUTANEOUS
Status: DISCONTINUED | OUTPATIENT
Start: 2025-05-02 | End: 2025-05-02 | Stop reason: HOSPADM

## 2025-05-02 RX ORDER — CEFAZOLIN 2 G/1
2 INJECTION, POWDER, FOR SOLUTION INTRAMUSCULAR; INTRAVENOUS
Status: DISCONTINUED | OUTPATIENT
Start: 2025-05-02 | End: 2025-05-02 | Stop reason: HOSPADM

## 2025-05-02 RX ORDER — SODIUM CHLORIDE 0.9 % (FLUSH) 0.9 %
10 SYRINGE (ML) INJECTION
Status: DISCONTINUED | OUTPATIENT
Start: 2025-05-02 | End: 2025-05-02 | Stop reason: HOSPADM

## 2025-05-02 RX ORDER — PROPOFOL 10 MG/ML
VIAL (ML) INTRAVENOUS
Status: DISCONTINUED | OUTPATIENT
Start: 2025-05-02 | End: 2025-05-02

## 2025-05-02 RX ORDER — DEXMEDETOMIDINE HYDROCHLORIDE 100 UG/ML
INJECTION, SOLUTION INTRAVENOUS
Status: DISCONTINUED | OUTPATIENT
Start: 2025-05-02 | End: 2025-05-02

## 2025-05-02 RX ORDER — OXYCODONE HYDROCHLORIDE 5 MG/1
5 TABLET ORAL EVERY 6 HOURS PRN
Qty: 12 TABLET | Refills: 0 | Status: SHIPPED | OUTPATIENT
Start: 2025-05-02

## 2025-05-02 RX ORDER — LIDOCAINE HYDROCHLORIDE 20 MG/ML
INJECTION, SOLUTION EPIDURAL; INFILTRATION; INTRACAUDAL; PERINEURAL
Status: DISCONTINUED | OUTPATIENT
Start: 2025-05-02 | End: 2025-05-02

## 2025-05-02 RX ORDER — DEXAMETHASONE SODIUM PHOSPHATE 4 MG/ML
INJECTION, SOLUTION INTRA-ARTICULAR; INTRALESIONAL; INTRAMUSCULAR; INTRAVENOUS; SOFT TISSUE
Status: DISCONTINUED | OUTPATIENT
Start: 2025-05-02 | End: 2025-05-02

## 2025-05-02 RX ORDER — ROCURONIUM BROMIDE 10 MG/ML
INJECTION, SOLUTION INTRAVENOUS
Status: DISCONTINUED | OUTPATIENT
Start: 2025-05-02 | End: 2025-05-02

## 2025-05-02 RX ORDER — ONDANSETRON HYDROCHLORIDE 2 MG/ML
INJECTION, SOLUTION INTRAVENOUS
Status: DISCONTINUED | OUTPATIENT
Start: 2025-05-02 | End: 2025-05-02

## 2025-05-02 RX ORDER — INDOCYANINE GREEN AND WATER 25 MG
KIT INJECTION
Status: DISCONTINUED | OUTPATIENT
Start: 2025-05-02 | End: 2025-05-02

## 2025-05-02 RX ORDER — METRONIDAZOLE 500 MG/100ML
500 INJECTION, SOLUTION INTRAVENOUS
Status: DISCONTINUED | OUTPATIENT
Start: 2025-05-02 | End: 2025-05-02 | Stop reason: HOSPADM

## 2025-05-02 RX ORDER — ACETAMINOPHEN 500 MG
1000 TABLET ORAL
Status: DISCONTINUED | OUTPATIENT
Start: 2025-05-02 | End: 2025-05-02 | Stop reason: HOSPADM

## 2025-05-02 RX ORDER — FENTANYL CITRATE 50 UG/ML
INJECTION, SOLUTION INTRAMUSCULAR; INTRAVENOUS
Status: DISCONTINUED | OUTPATIENT
Start: 2025-05-02 | End: 2025-05-02

## 2025-05-02 RX ORDER — HALOPERIDOL LACTATE 5 MG/ML
0.5 INJECTION, SOLUTION INTRAMUSCULAR EVERY 10 MIN PRN
Status: DISCONTINUED | OUTPATIENT
Start: 2025-05-02 | End: 2025-05-02 | Stop reason: HOSPADM

## 2025-05-02 RX ORDER — MIDAZOLAM HYDROCHLORIDE 1 MG/ML
INJECTION INTRAMUSCULAR; INTRAVENOUS
Status: DISCONTINUED | OUTPATIENT
Start: 2025-05-02 | End: 2025-05-02

## 2025-05-02 RX ORDER — GLUCAGON 1 MG
1 KIT INJECTION
Status: DISCONTINUED | OUTPATIENT
Start: 2025-05-02 | End: 2025-05-02 | Stop reason: HOSPADM

## 2025-05-02 RX ADMIN — SUGAMMADEX 200 MG: 100 INJECTION, SOLUTION INTRAVENOUS at 08:05

## 2025-05-02 RX ADMIN — MIDAZOLAM 2 MG: 1 INJECTION INTRAMUSCULAR; INTRAVENOUS at 06:05

## 2025-05-02 RX ADMIN — DEXAMETHASONE SODIUM PHOSPHATE 8 MG: 4 INJECTION INTRA-ARTICULAR; INTRALESIONAL; INTRAMUSCULAR; INTRAVENOUS; SOFT TISSUE at 07:05

## 2025-05-02 RX ADMIN — METRONIDAZOLE 500 MG: 5 INJECTION, SOLUTION INTRAVENOUS at 06:05

## 2025-05-02 RX ADMIN — DEXMEDETOMIDINE 8 MCG: 200 INJECTION, SOLUTION INTRAVENOUS at 08:05

## 2025-05-02 RX ADMIN — ROCURONIUM BROMIDE 60 MG: 10 INJECTION, SOLUTION INTRAVENOUS at 07:05

## 2025-05-02 RX ADMIN — GLYCOPYRROLATE 0.2 MG: 0.2 INJECTION, SOLUTION INTRAMUSCULAR; INTRAVENOUS at 07:05

## 2025-05-02 RX ADMIN — ACETAMINOPHEN 1000 MG: 500 TABLET ORAL at 06:05

## 2025-05-02 RX ADMIN — LIDOCAINE HYDROCHLORIDE 60 MG: 20 INJECTION, SOLUTION EPIDURAL; INFILTRATION; INTRACAUDAL at 07:05

## 2025-05-02 RX ADMIN — PROPOFOL 200 MG: 10 INJECTION, EMULSION INTRAVENOUS at 07:05

## 2025-05-02 RX ADMIN — INDOCYANINE GREEN AND WATER 2.5 MG: KIT at 07:05

## 2025-05-02 RX ADMIN — HEPARIN SODIUM 5000 UNITS: 5000 INJECTION INTRAVENOUS; SUBCUTANEOUS at 06:05

## 2025-05-02 RX ADMIN — SODIUM CHLORIDE: 9 INJECTION, SOLUTION INTRAVENOUS at 08:05

## 2025-05-02 RX ADMIN — MIDAZOLAM 2 MG: 1 INJECTION INTRAMUSCULAR; INTRAVENOUS at 07:05

## 2025-05-02 RX ADMIN — CELECOXIB 400 MG: 200 CAPSULE ORAL at 06:05

## 2025-05-02 RX ADMIN — HYDROMORPHONE HYDROCHLORIDE 0.2 MG: 1 INJECTION, SOLUTION INTRAMUSCULAR; INTRAVENOUS; SUBCUTANEOUS at 08:05

## 2025-05-02 RX ADMIN — CEFAZOLIN 2 G: 330 INJECTION, POWDER, FOR SOLUTION INTRAMUSCULAR; INTRAVENOUS at 07:05

## 2025-05-02 RX ADMIN — ONDANSETRON 4 MG: 2 INJECTION INTRAMUSCULAR; INTRAVENOUS at 08:05

## 2025-05-02 RX ADMIN — Medication 25 MG: at 07:05

## 2025-05-02 RX ADMIN — SODIUM CHLORIDE: 9 INJECTION, SOLUTION INTRAVENOUS at 06:05

## 2025-05-02 RX ADMIN — ROCURONIUM BROMIDE 20 MG: 10 INJECTION, SOLUTION INTRAVENOUS at 07:05

## 2025-05-02 RX ADMIN — FENTANYL CITRATE 100 MCG: 50 INJECTION INTRAMUSCULAR; INTRAVENOUS at 07:05

## 2025-05-02 NOTE — ANESTHESIA POSTPROCEDURE EVALUATION
Anesthesia Post Evaluation    Patient: Ron Weiss    Procedure(s) Performed: Procedure(s) (LRB):  XI ROBOTIC cystic duct excision (N/A)    Final Anesthesia Type: general      Patient location during evaluation: PACU  Patient participation: Yes- Able to Participate  Level of consciousness: awake and alert and oriented  Post-procedure vital signs: reviewed and stable  Pain management: adequate  Airway patency: patent    PONV status at discharge: No PONV  Anesthetic complications: no      Cardiovascular status: hemodynamically stable  Respiratory status: unassisted, spontaneous ventilation and room air  Hydration status: euvolemic  Follow-up not needed.              Vitals Value Taken Time   /78 05/02/25 10:15   Temp 36.3 °C (97.3 °F) 05/02/25 09:05   Pulse 71 05/02/25 10:15   Resp 14 05/02/25 10:15   SpO2 98 % 05/02/25 10:15         No case tracking events are documented in the log.      Pain/Dolly Score: Pain Rating Prior to Med Admin: 0 (5/2/2025  6:01 AM)  Dolly Score: 10 (5/2/2025  9:45 AM)

## 2025-05-02 NOTE — ANESTHESIA PROCEDURE NOTES
Intubation    Date/Time: 5/2/2025 7:06 AM    Performed by: Wolf Topeet MD  Authorized by: Frank Santo MD    Intubation:     Induction:  Intravenous    Intubated:  Postinduction    Mask Ventilation:  2 handed mask ventilation with 2 providers    Attempts:  2    Attempted By:  Resident anesthesiologist    Method of Intubation:  Direct and bougie    Blade:  Vasques 2    Laryngeal View Grade: Grade IIb - only the arytenoids and epiglottis seen      Blade (2nd Attempt):  Garcia 3    Laryngeal View Grade (2nd Attempt): Grade I - full view of cords      Difficult Airway Encountered?: No      Complications:  None    Airway Device:  Oral endotracheal tube    Airway Device Size:  7.5    Style/Cuff Inflation:  Cuffed (inflated to minimal occlusive pressure)    Tube secured:  23    Secured at:  The lips    Placement Verified By:  Capnometry    Complicating Factors:  Anterior larynx, obesity, short neck and large/floppy epiglottis    Findings Post-Intubation:  BS equal bilateral and atraumatic/condition of teeth unchanged  Notes:      Grade 2b with vasques and bougie. Unable to direct bougie into trachea. Garcia used on second attempt. Grade 1 view when using Garcia as a vasques (blade passed under epiglottis)

## 2025-05-02 NOTE — TRANSFER OF CARE
"Anesthesia Transfer of Care Note    Patient: Ron Weiss    Procedure(s) Performed: Procedure(s) (LRB):  XI ROBOTIC cystic duct excision (N/A)    Patient location: PACU    Anesthesia Type: general    Transport from OR: Transported from OR on 6-10 L/min O2 by face mask with adequate spontaneous ventilation    Post pain: adequate analgesia    Post assessment: no apparent anesthetic complications    Post vital signs: stable    Level of consciousness: awake and alert    Nausea/Vomiting: no nausea/vomiting    Complications: none    Transfer of care protocol was followed    Last vitals: Visit Vitals  BP (!) 142/78 (BP Location: Right arm, Patient Position: Lying)   Pulse 88   Temp 36.3 °C (97.3 °F) (Temporal)   Resp 18   Ht 5' 10" (1.778 m)   Wt 96.9 kg (213 lb 10 oz)   SpO2 99%   BMI 30.65 kg/m²     "

## 2025-05-02 NOTE — DISCHARGE INSTRUCTIONS
Discharge Instructions     WOUND CARE  -- You have skin glue over your incision(s); this will slowly flake away over the next few weeks. Do not pick at surgical glue, it will come off on its own.   -- Ok to shower; however, no baths or submerging in water (I.e. swimming, submerging in water) for at least two weeks.  -- Please keep the incision clean with soap and water, pat your incision dry, do not scrub hard over your incisions.     MEDICATIONS AND PAIN CONTROL  -- Please resume all home medications as instructed and take any newly prescribed medications.  -- Most people find that over-the-counter pain medications (Tylenol combined with Ibuprofen) will be sufficient for most pain control.  -- You have been prescribed a narcotic pain medication. Please wean narcotic over the next few daysIf you're taking prescription narcotics, do not drive or operate heavy machinery. Do not drive if your pain is not controlled enough for you to react quickly safely.  -- No straining, avoid constipation. May take OTC stool softeners as described and laxatives as needed.     OTHER INSTRUCTIONS  -- Monitor for temp > 101 F, bleeding, redness, purulent drainage, or any sudden, new extreme pain. If any occur, please call our clinic or go to the emergency department if after normal business hours.  -- You may resume your regular diet as tolerated.   -- No heavy lifting (anything >10 lbs or = to a gallon of milk) or strenuous exercise until cleared by a physician. No pushing or pulling activities such as vacuuming or raking. Otherwise, activity as tolerated.   -- Follow up with Dr. Foster in 2 weeks in clinic for a post-op check. Message sent to clinic for scheduling.

## 2025-05-02 NOTE — BRIEF OP NOTE
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 See op note for full details.    Estimated Blood Loss: minimal         Specimens:   Specimen (24h ago, onward)       Start     Ordered    05/02/25 0846  Specimen to Pathology  RELEASE UPON ORDERING        References:    Click here for ordering Quick Tip   Question:  Release to patient  Answer:  Immediate    05/02/25 0846                    ID Type Source Tests Collected by Time Destination   1 : 1- cystic duct excision, stitch marks new proximal margin -permanent Tissue Gallbladder SPECIMEN TO PATHOLOGY Robert Foster MD 5/2/2025 0846            Discharge Note    OUTCOME: Patient tolerated treatment/procedure well without complication and is now ready for discharge.    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]    FOLLOWUP: In clinic    DISCHARGE INSTRUCTIONS:    Discharge Procedure Orders   Diet Adult Regular     Notify your health care provider if you experience any of the following:  temperature >100.4     Notify your health care provider if you experience any of the following:  persistent nausea and vomiting or diarrhea     Notify your health care provider if you experience any of the following:  severe uncontrolled pain     Notify your health care provider if you experience any of the following:  redness, tenderness, or signs of infection (pain, swelling, redness, odor or green/yellow discharge around incision site)     Notify your health care provider if you experience any of the  following:  difficulty breathing or increased cough     Weight bearing restrictions (specify):   Order Comments: Do not lift greater than 10 lbs for 4 weeks

## 2025-05-05 VITALS
SYSTOLIC BLOOD PRESSURE: 128 MMHG | HEART RATE: 71 BPM | BODY MASS INDEX: 30.58 KG/M2 | HEIGHT: 70 IN | RESPIRATION RATE: 14 BRPM | TEMPERATURE: 97 F | OXYGEN SATURATION: 98 % | DIASTOLIC BLOOD PRESSURE: 78 MMHG | WEIGHT: 213.63 LBS

## 2025-05-05 LAB
ESTROGEN SERPL-MCNC: NORMAL PG/ML
INSULIN SERPL-ACNC: NORMAL U[IU]/ML
LAB AP CLINICAL INFORMATION: NORMAL
LAB AP GROSS DESCRIPTION: NORMAL
LAB AP PERFORMING LOCATION(S): NORMAL
LAB AP REPORT FOOTNOTES: NORMAL

## 2025-05-05 NOTE — OP NOTE
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  Risks and benefits were reviewed including bleeding, infection, damage to local structures, cardiovascular and pulmonary complications, need for additional procedures, death, and imponderables.  He understands and gave informed consent to proceed.    Details:  The patient was transported to the operating room and satisfactory anesthesia established.  Preoperative antibiotics were administered.  The patient was placed in the supine position and extremities were padded and protected throughout.  An appropriate timeout was performed.    I entered the abdomen in the left upper quadrant with an Optiview trocar and insufflated with CO2.  There is no evidence of injury on entry.  There were minimal adhesions from his previous surgery which were taken down.  Additional trocars were placed, the patient was positioned in the robot was docked.  ICG had previously been administered.  The liver was elevated to expose the benjamin hepatis.  Of the into incised the lateral peritoneum over the benjamin hepatis and dropped the hilar plate.  We encountered clips and using ICG I am able to identify a pretty long remnant of the cystic duct running adjacent to the common bile duct.  With careful dissection I am able to lateralize  this away from the bile duct.  The clips were removed to ensure that I am biliary.  I mobilize this all the way back to its origin of the common duct and then secured the cystic duct stump with a tie and a large clip.  The specimen was placed in an Endo-Catch bag.  Hemostasis was ensured.  The specimen was removed through the lateral trocar site which was closed with a suture Passer.  Paramidline trocars were removed under direct vision and the remaining trocars were removed after evacuating CO2.  Incisions were closed with Monocryl and Dermabond.      All needle, lap, and sponge counts were reported as correct. I communicated the intraoperative findings with the family following the procedure.     Implants: * No implants in log *    Specimens:   Specimen (24h ago, onward)      None                    Condition: Good    Disposition: PACU - hemodynamically stable.    Attestation: I was present and scrubbed for the entire procedure.

## 2025-05-13 ENCOUNTER — OFFICE VISIT (OUTPATIENT)
Dept: SURGERY | Facility: CLINIC | Age: 60
End: 2025-05-13
Payer: COMMERCIAL

## 2025-05-13 VITALS
WEIGHT: 214.06 LBS | DIASTOLIC BLOOD PRESSURE: 96 MMHG | SYSTOLIC BLOOD PRESSURE: 159 MMHG | HEART RATE: 76 BPM | OXYGEN SATURATION: 99 % | BODY MASS INDEX: 30.72 KG/M2

## 2025-05-13 DIAGNOSIS — D13.5: Primary | ICD-10-CM

## 2025-05-13 PROCEDURE — 3077F SYST BP >= 140 MM HG: CPT | Mod: CPTII,S$GLB,,

## 2025-05-13 PROCEDURE — 99024 POSTOP FOLLOW-UP VISIT: CPT | Mod: S$GLB,,,

## 2025-05-13 PROCEDURE — 3080F DIAST BP >= 90 MM HG: CPT | Mod: CPTII,S$GLB,,

## 2025-05-13 PROCEDURE — 1159F MED LIST DOCD IN RCRD: CPT | Mod: CPTII,S$GLB,,

## 2025-05-13 PROCEDURE — 4010F ACE/ARB THERAPY RXD/TAKEN: CPT | Mod: CPTII,S$GLB,,

## 2025-05-13 PROCEDURE — 99999 PR PBB SHADOW E&M-EST. PATIENT-LVL III: CPT | Mod: PBBFAC,,,

## 2025-06-19 NOTE — PROGRESS NOTES
Post-Op Follow-up Visit:   5/13/2025  Patient ID: Ron Weiss is a 60 y.o. male, born 1965    No chief complaint on file.    Interval History: Mr. Weiss returns to the clinic for a post operative follow up after robotic cystic duct excision on 5/2/25 with Dr. Fostre for a gallbladder polyp. He is doing well. Has no pain. Tolerating a regular diet without nausea or vomiting. Having bowel function. Denies fever, chills, SOB, chest pain.      Physical Exam:  BP (!) 159/96 (BP Location: Left arm, Patient Position: Sitting)   Pulse 76   Wt 97.1 kg (214 lb 1.1 oz)   SpO2 99%   BMI 30.72 kg/m²     General:  Non-toxic, ambulatory  Abd:  Soft, non-tender  Incision:  trocar sites CDI without redness, drainage, or sign of infection noted.     Pathology:  Final Diagnosis   Cystic duct, excision:  - Benign biliary tissue  - Negative for dysplasia or malignancy       ICD-10-CM ICD-9-CM    1. Extrahepatic bile duct adenoma  D13.5 211.5       Plan   Mr. Weiss returns to the clinic for a post operative follow up after robotic cystic duct excision on 5/2/25 with Dr. Foster for a gallbladder polyp. He is doing well. Has no pain. Tolerating a regular diet without nausea or vomiting. Having bowel function.   His trocar sites are healing well.   Pathology reviewed- polyp completely excised and negative for dyplasia or malignancy  Discussed lifting restrictions and care of the incisions  Questions were asked and answered to patient's satisfaction.  We discussed the need for continued clinical/radiographic/endoscopic follow-up.     Follow up if symptoms worsen or fail to improve.         DOROTHEA Dickson, FNP-C  Upper GI / Hepatobiliary Surgical Oncology  [facility] [place], [place]  Office: [phone]  Fax: [phone]

## 2025-07-16 DIAGNOSIS — Z12.5 SCREENING FOR PROSTATE CANCER: ICD-10-CM

## 2025-07-16 DIAGNOSIS — R79.89 LOW TESTOSTERONE LEVEL IN MALE: Primary | ICD-10-CM

## 2025-07-23 ENCOUNTER — LAB VISIT (OUTPATIENT)
Dept: LAB | Facility: HOSPITAL | Age: 60
End: 2025-07-23
Attending: UROLOGY
Payer: COMMERCIAL

## 2025-07-23 DIAGNOSIS — E78.00 PURE HYPERCHOLESTEROLEMIA: ICD-10-CM

## 2025-07-23 DIAGNOSIS — R79.89 LOW TESTOSTERONE LEVEL IN MALE: ICD-10-CM

## 2025-07-23 DIAGNOSIS — I10 PRIMARY HYPERTENSION: ICD-10-CM

## 2025-07-23 DIAGNOSIS — Z12.5 SCREENING FOR PROSTATE CANCER: ICD-10-CM

## 2025-07-23 DIAGNOSIS — K21.9 GASTROESOPHAGEAL REFLUX DISEASE, UNSPECIFIED WHETHER ESOPHAGITIS PRESENT: ICD-10-CM

## 2025-07-23 DIAGNOSIS — R17 SERUM TOTAL BILIRUBIN ELEVATED: ICD-10-CM

## 2025-07-23 DIAGNOSIS — R79.89 ELEVATED LFTS: Primary | ICD-10-CM

## 2025-07-23 LAB
ABSOLUTE EOSINOPHIL (OHS): 0.12 K/UL
ABSOLUTE MONOCYTE (OHS): 0.53 K/UL (ref 0.3–1)
ABSOLUTE NEUTROPHIL COUNT (OHS): 3.71 K/UL (ref 1.8–7.7)
ALBUMIN SERPL BCP-MCNC: 4.6 G/DL (ref 3.5–5.2)
ALP SERPL-CCNC: 68 UNIT/L (ref 40–150)
ALT SERPL W/O P-5'-P-CCNC: 38 UNIT/L (ref 10–44)
ANION GAP (OHS): 9 MMOL/L (ref 8–16)
AST SERPL-CCNC: 30 UNIT/L (ref 11–45)
BASOPHILS # BLD AUTO: 0.07 K/UL
BASOPHILS NFR BLD AUTO: 1.2 %
BILIRUB DIRECT SERPL-MCNC: 0.3 MG/DL (ref 0.1–0.3)
BILIRUB SERPL-MCNC: 1.3 MG/DL (ref 0.1–1)
BUN SERPL-MCNC: 19 MG/DL (ref 8–23)
CALCIUM SERPL-MCNC: 9.2 MG/DL (ref 8.7–10.5)
CHLORIDE SERPL-SCNC: 104 MMOL/L (ref 95–110)
CHOLEST SERPL-MCNC: 183 MG/DL (ref 120–199)
CHOLEST/HDLC SERPL: 4.2 {RATIO} (ref 2–5)
CO2 SERPL-SCNC: 23 MMOL/L (ref 23–29)
CREAT SERPL-MCNC: 1.2 MG/DL (ref 0.5–1.4)
EAG (OHS): 108 MG/DL (ref 68–131)
ERYTHROCYTE [DISTWIDTH] IN BLOOD BY AUTOMATED COUNT: 13.3 % (ref 11.5–14.5)
GFR SERPLBLD CREATININE-BSD FMLA CKD-EPI: >60 ML/MIN/1.73/M2
GLUCOSE SERPL-MCNC: 112 MG/DL (ref 70–110)
HBA1C MFR BLD: 5.4 % (ref 4–5.6)
HCT VFR BLD AUTO: 53.6 % (ref 40–54)
HDLC SERPL-MCNC: 44 MG/DL (ref 40–75)
HDLC SERPL: 24 % (ref 20–50)
HGB BLD-MCNC: 17.4 GM/DL (ref 14–18)
IMM GRANULOCYTES # BLD AUTO: 0.03 K/UL (ref 0–0.04)
IMM GRANULOCYTES NFR BLD AUTO: 0.5 % (ref 0–0.5)
LDLC SERPL CALC-MCNC: 98.8 MG/DL (ref 63–159)
LYMPHOCYTES # BLD AUTO: 1.49 K/UL (ref 1–4.8)
MCH RBC QN AUTO: 27.6 PG (ref 27–31)
MCHC RBC AUTO-ENTMCNC: 32.5 G/DL (ref 32–36)
MCV RBC AUTO: 85 FL (ref 82–98)
NONHDLC SERPL-MCNC: 139 MG/DL
NUCLEATED RBC (/100WBC) (OHS): 0 /100 WBC
PLATELET # BLD AUTO: 180 K/UL (ref 150–450)
PMV BLD AUTO: 10.8 FL (ref 9.2–12.9)
POTASSIUM SERPL-SCNC: 4.3 MMOL/L (ref 3.5–5.1)
PROT SERPL-MCNC: 8.4 GM/DL (ref 6–8.4)
PSA SERPL-MCNC: 1.01 NG/ML
RBC # BLD AUTO: 6.31 M/UL (ref 4.6–6.2)
RELATIVE EOSINOPHIL (OHS): 2 %
RELATIVE LYMPHOCYTE (OHS): 25 % (ref 18–48)
RELATIVE MONOCYTE (OHS): 8.9 % (ref 4–15)
RELATIVE NEUTROPHIL (OHS): 62.4 % (ref 38–73)
SODIUM SERPL-SCNC: 136 MMOL/L (ref 136–145)
TESTOST SERPL-MCNC: 1021 NG/DL (ref 304–1227)
TRIGL SERPL-MCNC: 201 MG/DL (ref 30–150)
WBC # BLD AUTO: 5.95 K/UL (ref 3.9–12.7)

## 2025-07-23 PROCEDURE — 36415 COLL VENOUS BLD VENIPUNCTURE: CPT | Mod: PO

## 2025-07-23 PROCEDURE — 84403 ASSAY OF TOTAL TESTOSTERONE: CPT

## 2025-07-23 PROCEDURE — 83036 HEMOGLOBIN GLYCOSYLATED A1C: CPT

## 2025-07-23 PROCEDURE — 82248 BILIRUBIN DIRECT: CPT

## 2025-07-23 PROCEDURE — 84153 ASSAY OF PSA TOTAL: CPT

## 2025-07-23 PROCEDURE — 82465 ASSAY BLD/SERUM CHOLESTEROL: CPT

## 2025-07-23 PROCEDURE — 82435 ASSAY OF BLOOD CHLORIDE: CPT

## 2025-07-23 PROCEDURE — 85025 COMPLETE CBC W/AUTO DIFF WBC: CPT

## 2025-07-30 ENCOUNTER — OFFICE VISIT (OUTPATIENT)
Dept: UROLOGY | Facility: CLINIC | Age: 60
End: 2025-07-30
Payer: COMMERCIAL

## 2025-07-30 VITALS — HEIGHT: 70 IN | BODY MASS INDEX: 30.93 KG/M2 | WEIGHT: 216.06 LBS

## 2025-07-30 DIAGNOSIS — R79.89 LOW TESTOSTERONE LEVEL IN MALE: ICD-10-CM

## 2025-07-30 DIAGNOSIS — N52.01 ERECTILE DYSFUNCTION DUE TO ARTERIAL INSUFFICIENCY: ICD-10-CM

## 2025-07-30 DIAGNOSIS — N40.0 BPH WITHOUT URINARY OBSTRUCTION: Primary | ICD-10-CM

## 2025-07-30 DIAGNOSIS — Z80.42 FAMILY HISTORY OF PROSTATE CANCER: ICD-10-CM

## 2025-07-30 LAB
BILIRUBIN, UA POC OHS: NEGATIVE
BLOOD, UA POC OHS: NEGATIVE
CLARITY, UA POC OHS: CLEAR
COLOR, UA POC OHS: YELLOW
GLUCOSE, UA POC OHS: NEGATIVE
KETONES, UA POC OHS: NEGATIVE
LEUKOCYTES, UA POC OHS: NEGATIVE
NITRITE, UA POC OHS: NEGATIVE
PH, UA POC OHS: 6.5
PROTEIN, UA POC OHS: NEGATIVE
SPECIFIC GRAVITY, UA POC OHS: 1.02
UROBILINOGEN, UA POC OHS: 1

## 2025-07-30 PROCEDURE — 3008F BODY MASS INDEX DOCD: CPT | Mod: CPTII,S$GLB,, | Performed by: UROLOGY

## 2025-07-30 PROCEDURE — 4010F ACE/ARB THERAPY RXD/TAKEN: CPT | Mod: CPTII,S$GLB,, | Performed by: UROLOGY

## 2025-07-30 PROCEDURE — 3044F HG A1C LEVEL LT 7.0%: CPT | Mod: CPTII,S$GLB,, | Performed by: UROLOGY

## 2025-07-30 PROCEDURE — 1159F MED LIST DOCD IN RCRD: CPT | Mod: CPTII,S$GLB,, | Performed by: UROLOGY

## 2025-07-30 PROCEDURE — 99214 OFFICE O/P EST MOD 30 MIN: CPT | Mod: S$GLB,,, | Performed by: UROLOGY

## 2025-07-30 PROCEDURE — 99999 PR PBB SHADOW E&M-EST. PATIENT-LVL III: CPT | Mod: PBBFAC,,, | Performed by: UROLOGY

## 2025-07-30 PROCEDURE — 81003 URINALYSIS AUTO W/O SCOPE: CPT | Mod: QW,S$GLB,, | Performed by: UROLOGY

## 2025-07-30 RX ORDER — SILDENAFIL 100 MG/1
100 TABLET, FILM COATED ORAL DAILY PRN
Qty: 10 TABLET | Refills: 11 | Status: SHIPPED | OUTPATIENT
Start: 2025-07-30 | End: 2026-07-30

## 2025-07-30 NOTE — PROGRESS NOTES
Subjective:       Patient ID: Ron Weiss is a 60 y.o. male.    Chief Complaint: Annual Exam    HPI    History of Present Illness    CHIEF COMPLAINT:  Patient presents today for annual follow up of testosterone replacement therapy.    TESTOSTERONE REPLACEMENT THERAPY:  He is currently on intramuscular testosterone 150 mg every 2 weeks. His current testosterone level is 1021, which has been stable. The testosterone is now covered by insurance. He plans to travel with testosterone medication and syringes for an upcoming month-long trip to [place].    SURGICAL HISTORY:  He underwent emergency gallbladder surgery with vascular hole repair. During the initial surgery, a serrated adenoma was discovered in the cystic duct. Due to uncertainty about complete removal, he underwent a second surgical procedure to ensure total excision of the precancerous polyp. The adenoma was likely the underlying reason for the initial emergency gallbladder surgery due to blockage. CT W Contrast was performed in preparation for the second surgery.    LABS / TEST RESULTS:  His PSA is 1.01, which has been stable. Lab counts are within normal range. During recent gallbladder surgery, liver values were significantly abnormal. Recent labs shows positive results and his overall health has improved.      PSA Diagnostic   Latest Ref Rng 0.00 - 4.00 ng/mL   2/4/2021 1.1    7/20/2021 1.0   3/2/2022 1.1    9/14/2022 0.85    3/29/2023 1.0    8/16/2023 1.3    2/28/2024 0.99   8/22/2024 2.1   2/5/2025 1.5   7/23/2025 1.01          Testosterone, Total   Latest Ref Rng 304 - 1227 ng/dL   3/2/2022 981    9/14/2022 1116    3/29/2023 819    8/16/2023 723    2/28/2024 939    8/22/2024 615    2/5/2025 842   7/23/2025 1021       MEDICATIONS:  He continues testosterone 150 mg intramuscular injection every 2 weeks. He has not used Sildenafil in approximately 1-2 years and plans to refill the prescription.  ROS:  General: -fever, -chills, -fatigue, -weight gain,  -weight loss  Eyes: -vision changes, -redness, -discharge  ENT: -ear pain, -nasal congestion, -sore throat  Cardiovascular: -chest pain, -palpitations, -lower extremity edema  Respiratory: -cough, -shortness of breath  Gastrointestinal: -abdominal pain, -nausea, -vomiting, -diarrhea, -constipation, -blood in stool  Genitourinary: -dysuria, -hematuria, -frequency  Musculoskeletal: -joint pain, -muscle pain  Skin: -rash, -lesion  Neurological: -headache, -dizziness, -numbness, -tingling  Psychiatric: -anxiety, -depression, -sleep difficulty  Male Genitourinary: +erectile dysfunction             Objective:      Urine dipstick shows negative for all components.    Physical Exam  Vitals reviewed.   Constitutional:       Appearance: He is well-developed.   Pulmonary:      Effort: Pulmonary effort is normal.   Neurological:      Mental Status: He is alert and oriented to person, place, and time.           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  - Considered upcoming extended travel and potential impact on testosterone replacement therapy.  - Discussed potential effects of abruptly stopping testosterone therapy, including energy level decrease and noticeable testosterone level dip.  - Continued testosterone replacement therapy at current dose of 150 mg IM every 2 weeks.  - Ordered labs for 6-month follow-up, including PSA, total testosterone, and CBC.    ERECTILE DYSFUNCTION:  - Refilled Sildenafil (Sandanfil) prescription, to be sent to [facility].    HISTORY OF DIGESTIVE SYSTEM DISEASES:  - Noted recent medical history including emergency gallbladder surgery, vascular hole repair, and removal of serrated adenoma in cystic duct.    FOLLOW-UP:  - Follow  up in 1 year.        Assessment:         1. BPH without urinary obstruction    2. Low testosterone level in male    3. Erectile dysfunction due to arterial insufficiency    4. Family history of prostate cancer          This note was generated with the assistance of ambient [place] technology. I attest to having reviewed and edited the generated note for accuracy, though some syntax or spelling errors may persist. Please contact the author of this note for any clarification.    Plan:       BPH without urinary obstruction  -     POCT Urinalysis(Instrument)  -     Prostate Specific Antigen, Diagnostic; Future; Expected date: 01/30/2026    Low testosterone level in male  -     Testosterone,Total; Future; Expected date: 01/30/2026  -     CBC Auto Differential; Future; Expected date: 01/30/2026    Erectile dysfunction due to arterial insufficiency    Family history of prostate cancer    Other orders  -     sildenafiL (VIAGRA) 100 MG tablet; Take 1 tablet (100 mg total) by mouth daily as needed for Erectile Dysfunction.  Dispense: 10 tablet; Refill: 11

## (undated) DEVICE — PAD PINK TRENDELENBURG POS XL

## (undated) DEVICE — SUT ABS CLIP LAPRA-TY CTD

## (undated) DEVICE — NDL HYPO REG 25G X 1 1/2

## (undated) DEVICE — ELECTRODE BLADE INSULATED 1 IN

## (undated) DEVICE — SEAL CANN UNIVERSAL 5-12MM

## (undated) DEVICE — DRAPE STERI INSTRUMENT 1018

## (undated) DEVICE — SUT PROLENE 3-0 SH DA 36 BL

## (undated) DEVICE — SUT VICRYL 3-0 27 SH

## (undated) DEVICE — DRAPE COLUMN DAVINCI XI

## (undated) DEVICE — SUT 2-0 12-18IN SILK

## (undated) DEVICE — SUT VICRYL+ 27 UR-6 VIOL

## (undated) DEVICE — SYS SEE SHARP SCP ANTIFG LNG

## (undated) DEVICE — CLIP HEMO-LOK ML

## (undated) DEVICE — DRAPE ARM DAVINCI XI

## (undated) DEVICE — ELECTRODE MEGADYNE RETURN DUAL

## (undated) DEVICE — DRAPE ABDOMINAL TIBURON 14X11

## (undated) DEVICE — BLADE SURG CARBON STEEL SZ11

## (undated) DEVICE — NDL INSUF ULTRA VERESS 120MM

## (undated) DEVICE — SPONGE LAP 4X18 PREWASHED

## (undated) DEVICE — TUBE SET SINGLE LUMEN FILTERED

## (undated) DEVICE — SUT PROLENE 4-0 RB-1 BL MO

## (undated) DEVICE — DRAPE SCOPE PILLOW WARMER

## (undated) DEVICE — ELECTRODE REM PLYHSV RETURN 9

## (undated) DEVICE — TRAY MINOR GEN SURG OMC

## (undated) DEVICE — SUT 2/0 30IN SILK BLK BRAI

## (undated) DEVICE — PENCIL ROCKER SWITCH 10FT CORD

## (undated) DEVICE — OBTURATOR BLADELESS 8MM XI

## (undated) DEVICE — TROCAR ENDOPATH XCEL 5X100MM

## (undated) DEVICE — SOL ELECTROLUBE ANTI-STIC

## (undated) DEVICE — COVER PROXIMA MAYO STAND

## (undated) DEVICE — SUT MONOCRYL 4-0 PS-2

## (undated) DEVICE — SYR 10CC LUER LOCK

## (undated) DEVICE — COVER TIP CURVED SCISSORS XI

## (undated) DEVICE — ADHESIVE DERMABOND ADVANCED